# Patient Record
Sex: FEMALE | Race: WHITE | NOT HISPANIC OR LATINO | Employment: OTHER | URBAN - METROPOLITAN AREA
[De-identification: names, ages, dates, MRNs, and addresses within clinical notes are randomized per-mention and may not be internally consistent; named-entity substitution may affect disease eponyms.]

---

## 2024-10-12 ENCOUNTER — HOSPITAL ENCOUNTER (EMERGENCY)
Facility: MEDICAL CENTER | Age: 74
End: 2024-10-12
Attending: STUDENT IN AN ORGANIZED HEALTH CARE EDUCATION/TRAINING PROGRAM
Payer: MEDICARE

## 2024-10-12 VITALS
BODY MASS INDEX: 21.9 KG/M2 | WEIGHT: 116 LBS | HEART RATE: 60 BPM | RESPIRATION RATE: 20 BRPM | DIASTOLIC BLOOD PRESSURE: 60 MMHG | TEMPERATURE: 97 F | HEIGHT: 61 IN | SYSTOLIC BLOOD PRESSURE: 125 MMHG | OXYGEN SATURATION: 93 %

## 2024-10-12 DIAGNOSIS — R56.9 SEIZURE-LIKE ACTIVITY (HCC): ICD-10-CM

## 2024-10-12 LAB
ALBUMIN SERPL BCP-MCNC: 4.2 G/DL (ref 3.2–4.9)
ALBUMIN/GLOB SERPL: 2 G/DL
ALP SERPL-CCNC: 85 U/L (ref 30–99)
ALT SERPL-CCNC: 18 U/L (ref 2–50)
ANION GAP SERPL CALC-SCNC: 17 MMOL/L (ref 7–16)
AST SERPL-CCNC: 23 U/L (ref 12–45)
BASOPHILS # BLD AUTO: 0.6 % (ref 0–1.8)
BASOPHILS # BLD: 0.04 K/UL (ref 0–0.12)
BILIRUB SERPL-MCNC: 0.7 MG/DL (ref 0.1–1.5)
BUN SERPL-MCNC: 13 MG/DL (ref 8–22)
CALCIUM ALBUM COR SERPL-MCNC: 9.4 MG/DL (ref 8.5–10.5)
CALCIUM SERPL-MCNC: 9.6 MG/DL (ref 8.5–10.5)
CHLORIDE SERPL-SCNC: 103 MMOL/L (ref 96–112)
CO2 SERPL-SCNC: 21 MMOL/L (ref 20–33)
CREAT SERPL-MCNC: 0.61 MG/DL (ref 0.5–1.4)
EKG IMPRESSION: NORMAL
EOSINOPHIL # BLD AUTO: 0.04 K/UL (ref 0–0.51)
EOSINOPHIL NFR BLD: 0.6 % (ref 0–6.9)
ERYTHROCYTE [DISTWIDTH] IN BLOOD BY AUTOMATED COUNT: 42.5 FL (ref 35.9–50)
GFR SERPLBLD CREATININE-BSD FMLA CKD-EPI: 93 ML/MIN/1.73 M 2
GLOBULIN SER CALC-MCNC: 2.1 G/DL (ref 1.9–3.5)
GLUCOSE SERPL-MCNC: 123 MG/DL (ref 65–99)
HCT VFR BLD AUTO: 35.1 % (ref 37–47)
HGB BLD-MCNC: 12.3 G/DL (ref 12–16)
IMM GRANULOCYTES # BLD AUTO: 0.03 K/UL (ref 0–0.11)
IMM GRANULOCYTES NFR BLD AUTO: 0.5 % (ref 0–0.9)
LYMPHOCYTES # BLD AUTO: 1.18 K/UL (ref 1–4.8)
LYMPHOCYTES NFR BLD: 17.9 % (ref 22–41)
MCH RBC QN AUTO: 31.7 PG (ref 27–33)
MCHC RBC AUTO-ENTMCNC: 35 G/DL (ref 32.2–35.5)
MCV RBC AUTO: 90.5 FL (ref 81.4–97.8)
MONOCYTES # BLD AUTO: 0.58 K/UL (ref 0–0.85)
MONOCYTES NFR BLD AUTO: 8.8 % (ref 0–13.4)
NEUTROPHILS # BLD AUTO: 4.74 K/UL (ref 1.82–7.42)
NEUTROPHILS NFR BLD: 71.6 % (ref 44–72)
NRBC # BLD AUTO: 0 K/UL
NRBC BLD-RTO: 0 /100 WBC (ref 0–0.2)
NT-PROBNP SERPL IA-MCNC: 149 PG/ML (ref 0–125)
PLATELET # BLD AUTO: 189 K/UL (ref 164–446)
PMV BLD AUTO: 11 FL (ref 9–12.9)
POTASSIUM SERPL-SCNC: 4 MMOL/L (ref 3.6–5.5)
PROT SERPL-MCNC: 6.3 G/DL (ref 6–8.2)
RBC # BLD AUTO: 3.88 M/UL (ref 4.2–5.4)
SODIUM SERPL-SCNC: 141 MMOL/L (ref 135–145)
TROPONIN T SERPL-MCNC: 7 NG/L (ref 6–19)
WBC # BLD AUTO: 6.6 K/UL (ref 4.8–10.8)

## 2024-10-12 PROCEDURE — 36415 COLL VENOUS BLD VENIPUNCTURE: CPT

## 2024-10-12 PROCEDURE — 83880 ASSAY OF NATRIURETIC PEPTIDE: CPT

## 2024-10-12 PROCEDURE — 93005 ELECTROCARDIOGRAM TRACING: CPT | Performed by: STUDENT IN AN ORGANIZED HEALTH CARE EDUCATION/TRAINING PROGRAM

## 2024-10-12 PROCEDURE — 84484 ASSAY OF TROPONIN QUANT: CPT

## 2024-10-12 PROCEDURE — 85025 COMPLETE CBC W/AUTO DIFF WBC: CPT

## 2024-10-12 PROCEDURE — 99284 EMERGENCY DEPT VISIT MOD MDM: CPT

## 2024-10-12 PROCEDURE — 80053 COMPREHEN METABOLIC PANEL: CPT

## 2024-10-16 ENCOUNTER — TELEPHONE (OUTPATIENT)
Dept: HEALTH INFORMATION MANAGEMENT | Facility: OTHER | Age: 74
End: 2024-10-16
Payer: MEDICARE

## 2024-10-23 ENCOUNTER — OFFICE VISIT (OUTPATIENT)
Dept: MEDICAL GROUP | Facility: MEDICAL CENTER | Age: 74
End: 2024-10-23
Payer: MEDICARE

## 2024-10-23 VITALS
BODY MASS INDEX: 22.43 KG/M2 | TEMPERATURE: 98.2 F | OXYGEN SATURATION: 93 % | WEIGHT: 118.83 LBS | DIASTOLIC BLOOD PRESSURE: 58 MMHG | HEIGHT: 61 IN | HEART RATE: 80 BPM | SYSTOLIC BLOOD PRESSURE: 120 MMHG

## 2024-10-23 DIAGNOSIS — F33.9 RECURRENT MAJOR DEPRESSIVE DISORDER, REMISSION STATUS UNSPECIFIED (HCC): ICD-10-CM

## 2024-10-23 DIAGNOSIS — I10 PRIMARY HYPERTENSION: ICD-10-CM

## 2024-10-23 DIAGNOSIS — Z23 NEED FOR VACCINATION: ICD-10-CM

## 2024-10-23 DIAGNOSIS — Z76.89 ENCOUNTER TO ESTABLISH CARE: ICD-10-CM

## 2024-10-23 DIAGNOSIS — Z86.73 HISTORY OF STROKE: ICD-10-CM

## 2024-10-23 DIAGNOSIS — E78.2 MIXED HYPERLIPIDEMIA: ICD-10-CM

## 2024-10-23 DIAGNOSIS — L98.9 LESION OF FINGER: ICD-10-CM

## 2024-10-23 DIAGNOSIS — R56.9 SEIZURE (HCC): ICD-10-CM

## 2024-10-23 DIAGNOSIS — N95.1 MENOPAUSAL STATE: ICD-10-CM

## 2024-10-23 DIAGNOSIS — Z78.0 POSTMENOPAUSAL STATUS (AGE-RELATED) (NATURAL): ICD-10-CM

## 2024-10-23 DIAGNOSIS — Z12.11 SCREEN FOR COLON CANCER: ICD-10-CM

## 2024-10-23 DIAGNOSIS — Z11.59 NEED FOR HEPATITIS C SCREENING TEST: ICD-10-CM

## 2024-10-23 DIAGNOSIS — R53.1 RIGHT SIDED WEAKNESS: ICD-10-CM

## 2024-10-23 PROBLEM — F32.A DEPRESSION: Status: ACTIVE | Noted: 2024-10-23

## 2024-10-23 PROCEDURE — 90670 PCV13 VACCINE IM: CPT | Mod: JZ | Performed by: NURSE PRACTITIONER

## 2024-10-23 PROCEDURE — 3074F SYST BP LT 130 MM HG: CPT | Performed by: NURSE PRACTITIONER

## 2024-10-23 PROCEDURE — 99204 OFFICE O/P NEW MOD 45 MIN: CPT | Mod: 25 | Performed by: NURSE PRACTITIONER

## 2024-10-23 PROCEDURE — 3078F DIAST BP <80 MM HG: CPT | Performed by: NURSE PRACTITIONER

## 2024-10-23 PROCEDURE — G0008 ADMIN INFLUENZA VIRUS VAC: HCPCS | Performed by: NURSE PRACTITIONER

## 2024-10-23 PROCEDURE — 90662 IIV NO PRSV INCREASED AG IM: CPT | Performed by: NURSE PRACTITIONER

## 2024-10-23 PROCEDURE — G0009 ADMIN PNEUMOCOCCAL VACCINE: HCPCS | Performed by: NURSE PRACTITIONER

## 2024-10-23 RX ORDER — ATORVASTATIN CALCIUM 20 MG/1
20 TABLET, FILM COATED ORAL
COMMUNITY
Start: 2024-09-17 | End: 2024-10-23 | Stop reason: SDUPTHER

## 2024-10-23 RX ORDER — AMLODIPINE BESYLATE 2.5 MG/1
TABLET ORAL
COMMUNITY
Start: 2024-09-17 | End: 2024-10-23 | Stop reason: SDUPTHER

## 2024-10-23 RX ORDER — AMLODIPINE BESYLATE 2.5 MG/1
2.5 TABLET ORAL DAILY
Qty: 90 TABLET | Refills: 1 | Status: SHIPPED | OUTPATIENT
Start: 2024-10-23

## 2024-10-23 RX ORDER — SERTRALINE HYDROCHLORIDE 25 MG/1
TABLET, FILM COATED ORAL
COMMUNITY
Start: 2024-09-15 | End: 2024-10-23 | Stop reason: SDUPTHER

## 2024-10-23 RX ORDER — SERTRALINE HYDROCHLORIDE 25 MG/1
25 TABLET, FILM COATED ORAL DAILY
Qty: 90 TABLET | Refills: 1 | Status: SHIPPED | OUTPATIENT
Start: 2024-10-23

## 2024-10-23 RX ORDER — LEVETIRACETAM 500 MG/1
TABLET ORAL
COMMUNITY
Start: 2024-09-17 | End: 2024-10-23 | Stop reason: SDUPTHER

## 2024-10-23 RX ORDER — LEVETIRACETAM 500 MG/1
500 TABLET ORAL 2 TIMES DAILY
Qty: 180 TABLET | Refills: 1 | Status: SHIPPED | OUTPATIENT
Start: 2024-10-23

## 2024-10-23 RX ORDER — ATORVASTATIN CALCIUM 20 MG/1
20 TABLET, FILM COATED ORAL
Qty: 90 TABLET | Refills: 1 | Status: SHIPPED | OUTPATIENT
Start: 2024-10-23

## 2024-10-23 ASSESSMENT — PATIENT HEALTH QUESTIONNAIRE - PHQ9: CLINICAL INTERPRETATION OF PHQ2 SCORE: 0

## 2024-10-23 ASSESSMENT — ENCOUNTER SYMPTOMS
SORE THROAT: 0
BLOOD IN STOOL: 0
CONSTIPATION: 0
NERVOUS/ANXIOUS: 0
SENSORY CHANGE: 0
EYE REDNESS: 0
INSOMNIA: 0
WEIGHT LOSS: 0
ABDOMINAL PAIN: 0
WHEEZING: 0
EYE PAIN: 0
BACK PAIN: 0
SPEECH CHANGE: 0
WEAKNESS: 0
EYE DISCHARGE: 0
POLYDIPSIA: 0
LOSS OF CONSCIOUSNESS: 0
HEADACHES: 0
SHORTNESS OF BREATH: 0
SINUS PAIN: 0
FEVER: 0
TREMORS: 0
DIZZINESS: 0
SPUTUM PRODUCTION: 0
CHILLS: 0
PALPITATIONS: 0
DEPRESSION: 0
NAUSEA: 0
COUGH: 0
FOCAL WEAKNESS: 0
MYALGIAS: 0
VOMITING: 0
BRUISES/BLEEDS EASILY: 0
DIARRHEA: 0
FLANK PAIN: 0

## 2024-10-23 ASSESSMENT — FIBROSIS 4 INDEX: FIB4 SCORE: 2.15

## 2024-10-24 ENCOUNTER — HOME CARE VISIT (OUTPATIENT)
Dept: HOME HEALTH SERVICES | Facility: HOME HEALTHCARE | Age: 74
End: 2024-10-24

## 2024-10-24 ENCOUNTER — HOME HEALTH ADMISSION (OUTPATIENT)
Dept: HOME HEALTH SERVICES | Facility: HOME HEALTHCARE | Age: 74
End: 2024-10-24
Payer: MEDICARE

## 2024-10-30 ENCOUNTER — HOME CARE VISIT (OUTPATIENT)
Dept: HOME HEALTH SERVICES | Facility: HOME HEALTHCARE | Age: 74
End: 2024-10-30
Payer: MEDICARE

## 2024-10-30 VITALS
HEART RATE: 62 BPM | HEIGHT: 61 IN | DIASTOLIC BLOOD PRESSURE: 70 MMHG | TEMPERATURE: 97.4 F | WEIGHT: 116 LBS | BODY MASS INDEX: 21.9 KG/M2 | SYSTOLIC BLOOD PRESSURE: 130 MMHG | RESPIRATION RATE: 16 BRPM | OXYGEN SATURATION: 95 %

## 2024-10-30 PROCEDURE — G0493 RN CARE EA 15 MIN HH/HOSPICE: HCPCS

## 2024-10-30 PROCEDURE — 665005 NO-PAY RAP - HOME HEALTH

## 2024-10-30 ASSESSMENT — ENCOUNTER SYMPTOMS
HIGHEST PAIN SEVERITY IN PAST 24 HOURS: 3/10
PAIN: 1
NAUSEA: DENIES
LOWEST PAIN SEVERITY IN PAST 24 HOURS: 0/10
SHORTNESS OF BREATH: 1
DIZZINESS: 1
DYSPNEA ACTIVITY LEVEL: AFTER AMBULATING MORE THAN 20 FT
STOOL FREQUENCY: LESS THAN DAILY
BOWEL PATTERN NORMAL: 1
PAIN SEVERITY GOAL: 0/10
VOMITING: DENIES
LAST BOWEL MOVEMENT: 67142
PAIN LOCATION - PAIN SEVERITY: 0/10
SUBJECTIVE PAIN PROGRESSION: UNCHANGED

## 2024-10-30 ASSESSMENT — ACTIVITIES OF DAILY LIVING (ADL): OASIS_M1830: 03

## 2024-10-30 ASSESSMENT — FIBROSIS 4 INDEX: FIB4 SCORE: 2.15

## 2024-10-31 ENCOUNTER — HOME CARE VISIT (OUTPATIENT)
Dept: HOME HEALTH SERVICES | Facility: HOME HEALTHCARE | Age: 74
End: 2024-10-31
Payer: MEDICARE

## 2024-10-31 ENCOUNTER — DOCUMENTATION (OUTPATIENT)
Dept: MEDICAL GROUP | Facility: PHYSICIAN GROUP | Age: 74
End: 2024-10-31
Payer: MEDICARE

## 2024-11-01 ENCOUNTER — HOME CARE VISIT (OUTPATIENT)
Dept: HOME HEALTH SERVICES | Facility: HOME HEALTHCARE | Age: 74
End: 2024-11-01
Payer: MEDICARE

## 2024-11-01 ENCOUNTER — TELEPHONE (OUTPATIENT)
Dept: HEALTH INFORMATION MANAGEMENT | Facility: OTHER | Age: 74
End: 2024-11-01
Payer: MEDICARE

## 2024-11-01 VITALS
RESPIRATION RATE: 14 BRPM | HEART RATE: 64 BPM | SYSTOLIC BLOOD PRESSURE: 130 MMHG | TEMPERATURE: 98.8 F | DIASTOLIC BLOOD PRESSURE: 56 MMHG | OXYGEN SATURATION: 97 %

## 2024-11-01 PROCEDURE — G0151 HHCP-SERV OF PT,EA 15 MIN: HCPCS

## 2024-11-01 ASSESSMENT — ENCOUNTER SYMPTOMS
PAIN LOCATION - PAIN QUALITY: ACHE
PAIN LOCATION - EXACERBATING FACTORS: MOVEMENT
LOWEST PAIN SEVERITY IN PAST 24 HOURS: 2/10
PAIN SEVERITY GOAL: 2/10
PAIN LOCATION - PAIN DURATION: DAILY
PAIN LOCATION - RELIEVING FACTORS: REST,
PERSON REPORTING PAIN: PATIENT
PAIN: 1
PAIN LOCATION - PAIN FREQUENCY: CONSTANT
PAIN LOCATION - PAIN SEVERITY: 2/10
PAIN LOCATION: RIGHT LEG
HIGHEST PAIN SEVERITY IN PAST 24 HOURS: 2/10

## 2024-11-01 ASSESSMENT — ACTIVITIES OF DAILY LIVING (ADL)
AMBULATION_DISTANCE/DURATION_TOLERATED: 30 FEET
AMBULATION ASSISTANCE ON UNEVEN SURFACES: 1
AMBULATION ASSISTANCE ON FLAT SURFACES: 1

## 2024-11-01 NOTE — Clinical Note
Physical therapy evaluation performed 11/1/2024 and I follow 1 time a week for 1 week and 2 times a week for 4 weeks.  Further insurance authorization may be required.  Thank you

## 2024-11-02 NOTE — HOME HEALTH
PHYSICAL THERAPY EVALUATION AND PLAN OF CARE     ·       Patient: Toshia Kulkarni     ·       Home Health Admission due to: Recent ED visit for seizure-like activity plus a referral from PCP who presents with decreased functional mobility, decreased balance, decreased activity tolerance, decreased functional extremity strength, significant right lower extremity extensor tone which limits her functioning, fused right ankle, some decreased memory, significant expressive aphasia and increased risk for falls      ·       Living Situation/PLOF: Patient lives in a single-story home with 2 steps without a railing to enter along with her son and son's family.  Patient's son is her primary caregiver.  Patient has SPC and a bath chair.  Son reports that patient has had mild decline in functioning since she arrived to live with her son 2 months ago.  He feels that her extensor tone in the right lower extremity really inhibits her functioning.  Patient's son does state that she was independent in her ADLs except for showering but assisted with most IADLs.     ·       Past Medical History: CVA (1999), seizure disorder, hyperlipidemia, depression, HTN      ·       Skilled Therapeutic Need: Decreased activity tolerance, LE weakness, Balance control, Generalized deconditioning, Gait training, Caregiver training, Fall prevention and Knowledge of condition     Recommend skilled HHPT to address deficits and improve function-report sent to MD     ·       Frequency:   1 time per week for 1 week and 2 times a week for 4 weeks,  Effective 11/1/2024    Does the patient get SOB with  exertion?  None noted during evaluation    How often (if at all) does pain interfere with patient's movements?  On a daily not constant basis

## 2024-11-04 NOTE — CASE COMMUNICATION
noted  ----- Message -----  From: Sofía Banks, PT  Sent: 11/1/2024   6:25 PM PST  To: Nadine Treadwell R.N.; Ashley Shin, SLP; *      Physical therapy evaluation performed 11/1/2024 and I follow 1 time a week for 1 week and 2 times a week for 4 weeks.  Further insurance authorization may be required.  Thank you

## 2024-11-06 ENCOUNTER — HOME CARE VISIT (OUTPATIENT)
Dept: HOME HEALTH SERVICES | Facility: HOME HEALTHCARE | Age: 74
End: 2024-11-06
Payer: MEDICARE

## 2024-11-06 PROCEDURE — G0151 HHCP-SERV OF PT,EA 15 MIN: HCPCS

## 2024-11-06 NOTE — Clinical Note
Pt has strong extensor tone and synergy pattern which greatly limits function. Would you consider referral to physiatry for evaluation and Tx?    Patient has an outpatient wound care appointment on Monday to assess her right third digit.  Today it appeared reddened and therapist instructed patient that it should the redness worsen or the scab opened up patient should go to urgent care to be assessed or contact [facility].  Patient and son had good verbal return

## 2024-11-06 NOTE — Clinical Note
thank you for responding. Have a gtreat weekend  ----- Message -----  From: LUKE Sanchez  Sent: 11/7/2024  10:19 AM PST  To: Sofía Banks PT      Thank you Sofía, I placed a referral to physiatry.  Have a great week,    Libby  ----- Message -----  From: Sofía Banks PT  Sent: 11/7/2024   3:32 AM PST  To: Nadine Treadwell R.N.; LUKE Sanchez    Pt has strong extensor tone and synergy pattern which greatly limits function. Would you consider referral to physiatry for evaluation and Tx?    Patient has an outpatient wound care appointment on Monday to assess her right third digit.  Today it appeared reddened and therapist instructed patient that it should the redness worsen or the scab opened up patient should go to urgent care to be assessed or contact [facility].  Patient and son had good verbal return

## 2024-11-07 ENCOUNTER — HOME CARE VISIT (OUTPATIENT)
Dept: HOME HEALTH SERVICES | Facility: HOME HEALTHCARE | Age: 74
End: 2024-11-07
Payer: MEDICARE

## 2024-11-07 ENCOUNTER — TELEPHONE (OUTPATIENT)
Dept: MEDICAL GROUP | Facility: MEDICAL CENTER | Age: 74
End: 2024-11-07
Payer: MEDICARE

## 2024-11-07 VITALS
DIASTOLIC BLOOD PRESSURE: 64 MMHG | OXYGEN SATURATION: 96 % | TEMPERATURE: 97.8 F | SYSTOLIC BLOOD PRESSURE: 128 MMHG | RESPIRATION RATE: 16 BRPM | HEART RATE: 62 BPM

## 2024-11-07 VITALS
DIASTOLIC BLOOD PRESSURE: 66 MMHG | OXYGEN SATURATION: 98 % | SYSTOLIC BLOOD PRESSURE: 130 MMHG | HEART RATE: 68 BPM | TEMPERATURE: 99.3 F | RESPIRATION RATE: 14 BRPM

## 2024-11-07 DIAGNOSIS — R53.1 RIGHT SIDED WEAKNESS: ICD-10-CM

## 2024-11-07 PROCEDURE — G0153 HHCP-SVS OF S/L PATH,EA 15MN: HCPCS

## 2024-11-07 ASSESSMENT — ENCOUNTER SYMPTOMS
SUBJECTIVE PAIN PROGRESSION: WAXING AND WANING
PERSON REPORTING PAIN: PATIENT
PAIN SEVERITY GOAL: 0/10
LOWEST PAIN SEVERITY IN PAST 24 HOURS: 0/10
HIGHEST PAIN SEVERITY IN PAST 24 HOURS: 0/10
DENIES PAIN: 1

## 2024-11-08 ENCOUNTER — HOME CARE VISIT (OUTPATIENT)
Dept: HOME HEALTH SERVICES | Facility: HOME HEALTHCARE | Age: 74
End: 2024-11-08
Payer: MEDICARE

## 2024-11-08 PROCEDURE — G0151 HHCP-SERV OF PT,EA 15 MIN: HCPCS

## 2024-11-08 NOTE — CASE COMMUNICATION
noted  ----- Message -----  From: Sofía Banks, PT  Sent: 11/7/2024   3:32 AM PST  To: Nadine Treadwell R.N.; TREVIN Sanchez.      Pt has strong extensor tone and synergy pattern which greatly limits function. Would you consider referral to physiatry for evaluation and Tx?    Patient has an outpatient wound care appointment on Monday to assess her right third digit.  Today it appeared reddened and therapist instructed patient  that it should the redness worsen or the scab opened up patient should go to urgent care to be assessed or contact [facility].  Patient and son had good verbal return

## 2024-11-09 VITALS
TEMPERATURE: 98.1 F | RESPIRATION RATE: 12 BRPM | DIASTOLIC BLOOD PRESSURE: 62 MMHG | HEART RATE: 68 BPM | SYSTOLIC BLOOD PRESSURE: 110 MMHG | OXYGEN SATURATION: 96 %

## 2024-11-09 ASSESSMENT — ENCOUNTER SYMPTOMS
DENIES PAIN: 1
HIGHEST PAIN SEVERITY IN PAST 24 HOURS: 0/10
PAIN SEVERITY GOAL: 0/10
LOWEST PAIN SEVERITY IN PAST 24 HOURS: 0/10
PERSON REPORTING PAIN: PATIENT
SUBJECTIVE PAIN PROGRESSION: UNCHANGED

## 2024-11-11 ENCOUNTER — HOME CARE VISIT (OUTPATIENT)
Dept: HOME HEALTH SERVICES | Facility: HOME HEALTHCARE | Age: 74
End: 2024-11-11
Payer: MEDICARE

## 2024-11-11 ENCOUNTER — OFFICE VISIT (OUTPATIENT)
Dept: WOUND CARE | Facility: MEDICAL CENTER | Age: 74
End: 2024-11-11
Attending: NURSE PRACTITIONER
Payer: MEDICARE

## 2024-11-11 VITALS
HEART RATE: 71 BPM | DIASTOLIC BLOOD PRESSURE: 70 MMHG | OXYGEN SATURATION: 96 % | TEMPERATURE: 97.2 F | RESPIRATION RATE: 15 BRPM | SYSTOLIC BLOOD PRESSURE: 122 MMHG

## 2024-11-11 DIAGNOSIS — R53.1 RIGHT SIDED WEAKNESS: ICD-10-CM

## 2024-11-11 DIAGNOSIS — S61.202A OPEN WOUND OF RIGHT MIDDLE FINGER: ICD-10-CM

## 2024-11-11 DIAGNOSIS — Z86.73 HISTORY OF STROKE: ICD-10-CM

## 2024-11-11 PROCEDURE — 11042 DBRDMT SUBQ TIS 1ST 20SQCM/<: CPT

## 2024-11-11 PROCEDURE — 99204 OFFICE O/P NEW MOD 45 MIN: CPT | Performed by: STUDENT IN AN ORGANIZED HEALTH CARE EDUCATION/TRAINING PROGRAM

## 2024-11-11 PROCEDURE — 99214 OFFICE O/P EST MOD 30 MIN: CPT

## 2024-11-11 PROCEDURE — 3078F DIAST BP <80 MM HG: CPT | Performed by: STUDENT IN AN ORGANIZED HEALTH CARE EDUCATION/TRAINING PROGRAM

## 2024-11-11 PROCEDURE — 3074F SYST BP LT 130 MM HG: CPT | Performed by: STUDENT IN AN ORGANIZED HEALTH CARE EDUCATION/TRAINING PROGRAM

## 2024-11-11 ASSESSMENT — ENCOUNTER SYMPTOMS
SEIZURES: 1
FOCAL WEAKNESS: 1
FEVER: 0
CLAUDICATION: 0
CHILLS: 0
SPEECH CHANGE: 1

## 2024-11-11 NOTE — CASE COMMUNICATION
Quality Review for SOC OASIS by ISHA Saenz RN on  November 11, 2024    Edits completed by ISHA Saenz RN:  1.  and  diagnosis coding updated per chart review.  2. Changed  to 10/30/24 per the LSOC order  3.  changed to 13 per care plan therapy sets.  4. Unchecked antipsychotic in  per MAR

## 2024-11-11 NOTE — PROGRESS NOTES
Patient has PT and SLP through [facility]. Patient and family are able to manage wound dressings and don't require RN visits for wound care at this time.Education provided on dressing changes.

## 2024-11-11 NOTE — CASE COMMUNICATION
I agree with these changes.  ----- Message -----  From: Sveta Saenz R.N.  Sent: 11/11/2024  11:16 AM PST  To: Emma Carbajal R.N.      Quality Review for SOC OASIS by ISHA Saenz RN on  November 11, 2024    Edits completed by ISHA Saenz RN:  1.  and  diagnosis coding updated per chart review.  2. Changed  to 10/30/24 per the LSOC order  3.  changed to 13 per care plan therapy sets.  4. Unchecked ant ipsychotic in  per MAR

## 2024-11-11 NOTE — PROGRESS NOTES
Provider Encounter- Full Thickness wound    HISTORY OF PRESENT ILLNESS  Wound History:    START OF CARE IN CLINIC: 11/11/2024    REFERRING PROVIDER: Libby Fu      WOUND- Full Thickness Wound   LOCATION: Right dorsal 3rd finger wound   HISTORY:  74F with PMHx of remove CVA with right sided weakness, extensive surgery right arm due to contractures and reportedly all tendons purposely cut to prevent contracture. Patient is poor historian, she reports feeling pain right 3rd finger then noted wounds approximately 9+ months ago. She does not recall any previous traumatic events. Patient has had extensive right arm and hand surgery in past to resolve contractures of right arm and hand, she reports tendons in hand were surgically cut. Patient was seeing dermatology in [place] for hand wound, reportedly was biopsied and negative for malignancy. They were applying mupirocin which they said made wound worse. She is currently cleaning and leaving wound open to air. Patient was seen by PCP and referred to [facility] and to hand surgery, they did not schedule hand surgery appointment as they wanted to have evaluation of wound in wound clinic first.    Pertinent Medical History: See above     TOBACCO USE:  Denies use    Patient's problem list, allergies, and current medications reviewed and updated in Epic    Interval History:  11/11/2024: Clinic visit with Osbaldo Buck MD. Patient is accompanied by son. Patient recently moved to [place] from [place] and is establishing with medical system here in [place] where she will be residing long term. Patient with prior history of stroke with expressive aphasia and right arm weakness. They report that she had extensive prior surgical history of right arm and hand in past due to contractures that she had previously developed. She does not recall any traumatic events that preceded wound formation.    REVIEW OF SYSTEMS:   Review of Systems   Constitutional:  Negative for chills,  fever and malaise/fatigue.   Cardiovascular:  Negative for claudication and leg swelling.   Skin:         Open wound right middle finger   Neurological:  Positive for speech change, focal weakness and seizures.        All chronic from remote stroke       PHYSICAL EXAMINATION:   /70   Pulse 71   Temp 36.2 °C (97.2 °F) (Temporal)   Resp 15   SpO2 96%     Physical Exam  Constitutional:       General: She is not in acute distress.     Appearance: Normal appearance.   Cardiovascular:      Rate and Rhythm: Normal rate.      Pulses: Normal pulses.   Pulmonary:      Effort: Pulmonary effort is normal. No respiratory distress.   Skin:     Comments: Open wound right dorsal middle finger: Small open wound with undermining. Pinpoint wound tract that appears to probe to bone or joint. Purulence expressed from wound. No evidence of cellulitis.   Neurological:      Mental Status: She is alert.      Motor: Weakness present.      Comments: Chronic right sided weakness  Expressive aphasia         WOUND ASSESSMENT  Wound Finger, 3rd Distal Right (Active)   Wound Image    11/11/24 1400   Site Assessment Red 11/11/24 1400   Periwound Assessment Pink;Fragile;Scar tissue 11/11/24 1400   Margins Attached edges 11/11/24 1400   Drainage Amount Moderate 11/11/24 1400   Drainage Description Serosanguineous;Purulent 11/11/24 1400   Treatments Cleansed;Topical Lidocaine;Provider debridement;Site care 11/11/24 1400   Wound Cleansing Hypochlorus Acid 11/11/24 1400   Periwound Protectant No-sting Skin Prep 11/11/24 1400   Dressing Cleansing/Solutions Not Applicable 11/11/24 1400   Dressing Options Hydrofiber Silver;Hypafix Tape 11/11/24 1400   Dressing Change/Treatment Frequency Every 72 hrs, and As Needed 11/11/24 1400   Wound Team Following Weekly 11/11/24 1400   Wound Length (cm) 0 cm 11/11/24 1400   Wound Width (cm) 0 cm 11/11/24 1400   Wound Depth (cm) 0 cm 11/11/24 1400   Wound Surface Area (cm^2) 0 cm^2 11/11/24 1400   Wound Volume  "(cm^3) 0 cm^3 11/11/24 1400   Tunneling (cm) 0 cm 11/11/24 1400   Undermining (cm) 0 cm 11/11/24 1400   Wound Odor None 11/11/24 1400   Exposed Structures None 11/11/24 1400   Number of days:        PROCEDURE:   - No excisional debridement performed.  - Expressed purulence manually  - Nonselective debridement with cotton tipped applicator      Pertinent Labs and Diagnostics:    Labs:     A1c: No results found for: \"HBA1C\"       IMAGING: None    VASCULAR STUDIES: None    LAST  WOUND CULTURE:  DATE : No results found for: \"CULTRSULT\"      ASSESSMENT AND PLAN:     1. Open wound of right middle finger    11/11/2024  - Patient with small wound, unknown etiology  - Due to pinpoint wound tract that may communicate with bone or joint, will obtain Xray  - They have not made appointment with [facility] Hand surgery. Following imaging may consider making appointment  - Hold Abx currently  - Will treat with antimicrobial dressings  - Return to clinic next week   Wound Care: Hydrofiber silver, tape    2. History of stroke  3. Right sided weakness    11/11/2024  - Patient with extensive surgical history of right arm and hand to relive her of previously developed contractures after stroke  - Has good family support      PATIENT EDUCATION  - Importance of adequate nutrition for wound healing  -Advised to go to ER for any increased redness, swelling, drainage, or odor, or if patient develops fever, chills, nausea or vomiting.     My total time spent caring for the patient on the day of the encounter was 45 minutes, reviewing history, assessment, counseling and education, and coordination of care.  This does not include time spent on separately billable procedures/tests.    Please note that this note may have been created using voice recognition software. I have worked with technical experts from [employer] to optimize the interface.  I have made every reasonable attempt to correct obvious errors, but there may be errors of grammar and " possibly content that I did not discover before finalizing the note.    N

## 2024-11-11 NOTE — PATIENT INSTRUCTIONS
-Keep your wound dressing clean, dry, and intact. Change dressing every 2-3 days AND if it's over saturated, soiled or falls off.     -Should you experience any significant changes in your wound(s), such as infection (redness, swelling, localized heat, increased pain, fever > 101 F, chills) or have any questions regarding your home care instructions, please contact the wound center at [phone]. If after hours, contact your primary care physician or go to the hospital emergency room.  If you are admitted to any hospital, you will need a new referral to come back to the wound clinic and any scheduled appointments that you already have, may be cancelled.

## 2024-11-12 ENCOUNTER — HOME CARE VISIT (OUTPATIENT)
Dept: HOME HEALTH SERVICES | Facility: HOME HEALTHCARE | Age: 74
End: 2024-11-12
Payer: MEDICARE

## 2024-11-12 VITALS
DIASTOLIC BLOOD PRESSURE: 66 MMHG | TEMPERATURE: 97.9 F | HEART RATE: 72 BPM | SYSTOLIC BLOOD PRESSURE: 118 MMHG | OXYGEN SATURATION: 97 % | RESPIRATION RATE: 16 BRPM

## 2024-11-12 VITALS
SYSTOLIC BLOOD PRESSURE: 118 MMHG | RESPIRATION RATE: 16 BRPM | OXYGEN SATURATION: 97 % | DIASTOLIC BLOOD PRESSURE: 66 MMHG | TEMPERATURE: 97.9 F | HEART RATE: 72 BPM

## 2024-11-12 PROCEDURE — G0151 HHCP-SERV OF PT,EA 15 MIN: HCPCS

## 2024-11-12 PROCEDURE — G0153 HHCP-SVS OF S/L PATH,EA 15MN: HCPCS

## 2024-11-12 ASSESSMENT — ENCOUNTER SYMPTOMS
PERSON REPORTING PAIN: PATIENT
LOWEST PAIN SEVERITY IN PAST 24 HOURS: 0/10
POOR JUDGMENT: 1
PAIN: PAIN DOES NOT LIMIT
PAIN LOCATION: RIGHT HAND
SUBJECTIVE PAIN PROGRESSION: WAXING AND WANING
PAIN SEVERITY GOAL: 0/10
HIGHEST PAIN SEVERITY IN PAST 24 HOURS: 3/10
DENIES PAIN: 1

## 2024-11-13 ENCOUNTER — HOME CARE VISIT (OUTPATIENT)
Dept: HOME HEALTH SERVICES | Facility: HOME HEALTHCARE | Age: 74
End: 2024-11-13
Payer: MEDICARE

## 2024-11-13 ENCOUNTER — OFFICE VISIT (OUTPATIENT)
Dept: PHYSICAL MEDICINE AND REHAB | Facility: MEDICAL CENTER | Age: 74
End: 2024-11-13
Payer: MEDICARE

## 2024-11-13 VITALS
TEMPERATURE: 97 F | SYSTOLIC BLOOD PRESSURE: 124 MMHG | OXYGEN SATURATION: 96 % | HEIGHT: 61 IN | DIASTOLIC BLOOD PRESSURE: 72 MMHG | BODY MASS INDEX: 22.52 KG/M2 | HEART RATE: 75 BPM | WEIGHT: 119.27 LBS

## 2024-11-13 DIAGNOSIS — R53.1 RIGHT SIDED WEAKNESS: ICD-10-CM

## 2024-11-13 DIAGNOSIS — I69.353: ICD-10-CM

## 2024-11-13 DIAGNOSIS — Z71.82 EXERCISE COUNSELING: ICD-10-CM

## 2024-11-13 DIAGNOSIS — M89.8X1 PAIN OF RIGHT SCAPULA: ICD-10-CM

## 2024-11-13 DIAGNOSIS — R25.2 SPASTICITY: ICD-10-CM

## 2024-11-13 DIAGNOSIS — Z86.73 HISTORY OF STROKE: ICD-10-CM

## 2024-11-13 DIAGNOSIS — M79.604 ACUTE LEG PAIN, RIGHT: ICD-10-CM

## 2024-11-13 DIAGNOSIS — Z71.3 DIETARY COUNSELING: ICD-10-CM

## 2024-11-13 DIAGNOSIS — Z91.81 RISK FOR FALLS: ICD-10-CM

## 2024-11-13 DIAGNOSIS — R47.01: ICD-10-CM

## 2024-11-13 PROCEDURE — 99204 OFFICE O/P NEW MOD 45 MIN: CPT | Performed by: GENERAL PRACTICE

## 2024-11-13 PROCEDURE — 3078F DIAST BP <80 MM HG: CPT | Performed by: GENERAL PRACTICE

## 2024-11-13 PROCEDURE — G2211 COMPLEX E/M VISIT ADD ON: HCPCS | Performed by: GENERAL PRACTICE

## 2024-11-13 PROCEDURE — 3074F SYST BP LT 130 MM HG: CPT | Performed by: GENERAL PRACTICE

## 2024-11-13 PROCEDURE — 1125F AMNT PAIN NOTED PAIN PRSNT: CPT | Performed by: GENERAL PRACTICE

## 2024-11-13 RX ORDER — BACLOFEN 10 MG/1
5-10 TABLET ORAL NIGHTLY
Qty: 30 TABLET | Refills: 2 | Status: SHIPPED | OUTPATIENT
Start: 2024-11-13 | End: 2025-02-11

## 2024-11-13 ASSESSMENT — FIBROSIS 4 INDEX: FIB4 SCORE: 2.15

## 2024-11-13 ASSESSMENT — PATIENT HEALTH QUESTIONNAIRE - PHQ9
SUM OF ALL RESPONSES TO PHQ QUESTIONS 1-9: 3
CLINICAL INTERPRETATION OF PHQ2 SCORE: 2
5. POOR APPETITE OR OVEREATING: 0 - NOT AT ALL

## 2024-11-13 ASSESSMENT — PAIN SCALES - GENERAL: PAINLEVEL_OUTOF10: 5=MODERATE PAIN

## 2024-11-13 NOTE — Clinical Note
Libby Fu   Toshia Kulkarni was evaluated for right-sided weakness secondary to her stroke.  She actually has spasticity and contractures.  Going to trial baclofen.  Please encourage patient and son to obtain prior clinical notes for the last year or 2 to assist with her management plan.  Thank you for the referral.   Please text, voalte, or call me if further questions.       Best Regards,   Wilmer Mondragon, DO   Physical Medicine and Rehabilitation

## 2024-11-13 NOTE — CASE COMMUNICATION
Patient discussed today in interdisciplinary team meeting. Concerns noted of finger wound. Plan to address issue is going to wound clinic. Son needs to schedule XRay & SHARONDA followup.
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
## 2024-11-14 ENCOUNTER — HOSPITAL ENCOUNTER (OUTPATIENT)
Dept: RADIOLOGY | Facility: MEDICAL CENTER | Age: 74
End: 2024-11-14
Attending: GENERAL PRACTICE
Payer: MEDICARE

## 2024-11-14 ENCOUNTER — HOME CARE VISIT (OUTPATIENT)
Dept: HOME HEALTH SERVICES | Facility: HOME HEALTHCARE | Age: 74
End: 2024-11-14
Payer: MEDICARE

## 2024-11-14 ENCOUNTER — HOSPITAL ENCOUNTER (OUTPATIENT)
Dept: RADIOLOGY | Facility: MEDICAL CENTER | Age: 74
End: 2024-11-14
Attending: STUDENT IN AN ORGANIZED HEALTH CARE EDUCATION/TRAINING PROGRAM
Payer: MEDICARE

## 2024-11-14 ENCOUNTER — TELEPHONE (OUTPATIENT)
Dept: WOUND CARE | Facility: MEDICAL CENTER | Age: 74
End: 2024-11-14
Payer: MEDICARE

## 2024-11-14 VITALS
DIASTOLIC BLOOD PRESSURE: 64 MMHG | SYSTOLIC BLOOD PRESSURE: 118 MMHG | OXYGEN SATURATION: 94 % | RESPIRATION RATE: 16 BRPM | HEART RATE: 71 BPM | TEMPERATURE: 98.7 F

## 2024-11-14 DIAGNOSIS — M79.604 ACUTE LEG PAIN, RIGHT: ICD-10-CM

## 2024-11-14 DIAGNOSIS — M89.8X1 PAIN OF RIGHT SCAPULA: ICD-10-CM

## 2024-11-14 DIAGNOSIS — S61.202A OPEN WOUND OF RIGHT MIDDLE FINGER: ICD-10-CM

## 2024-11-14 PROCEDURE — 73564 X-RAY EXAM KNEE 4 OR MORE: CPT | Mod: RT

## 2024-11-14 PROCEDURE — 73030 X-RAY EXAM OF SHOULDER: CPT | Mod: RT

## 2024-11-14 PROCEDURE — G0153 HHCP-SVS OF S/L PATH,EA 15MN: HCPCS

## 2024-11-14 PROCEDURE — 73140 X-RAY EXAM OF FINGER(S): CPT | Mod: RT

## 2024-11-14 PROCEDURE — 73010 X-RAY EXAM OF SHOULDER BLADE: CPT | Mod: RT

## 2024-11-14 NOTE — Clinical Note
Patient refused 1 physical therapy visit this week and she did not want a substitute while this therapist was off.

## 2024-11-14 NOTE — PATIENT INSTRUCTIONS
"Diet  \"-Emphasizes fruits, vegetables, whole grains, and fat-free or low-fat milk and milk products  -Includes a variety of protein foods such as seafood, lean meats and poultry, eggs, legumes (beans and peas), soy products, nuts, and seeds.  -Is low in added sugars, sodium, saturated fats, trans fats, and cholesterol.  -Stays within your daily calorie needs\"  https://www.cdc.gov/healthyweight/healthy_eating/index.html    Exercise  \"We know 150 minutes of physical activity each week sounds like a lot, but you don’t have to do it all at once. It could be 30 minutes a day, 5 days a week. You can spread your activity out during the week and break it up into smaller chunks of time.\"  https://www.cdc.gov/physicalactivity/basics/adults/index.htm  \"Exercise, multidisciplinary rehabilitation, acupuncture, CBT, and mind-body practices were most consistently associated with durable slight to moderate improvements in function and pain for specific chronic pain conditions. \"  https://effectivehealthcare.ahrq.gov/products/nonpharma-treatment-pain/research-2018      Fall prevention  -please wear indoor closed toe shoes.  Avoid high heels, floppy slippers, socks, without and shoes with slick soles that can make you slips, stumble, and fall  -work on single leg balance  -Remove boxes, newspapers, electrical cords and phone cords from walkways.  -Move coffee tables, magazine racks and plant stands from high-traffic areas.  -Secure loose rugs with double-faced tape, tacks or a slip-resistant backing -- or remove loose rugs from your home.  -Repair loose, wooden floorboards and carpeting right away.  -Store clothing, dishes, food and other necessities within easy reach.  -Immediately clean spilled liquids, grease or food.  -Use nonslip mats in your bathtub or shower. Use a bath seat, which allows you to sit while showering.  Her  -keep home well lit  -Place night lights in your bedroom, bathroom and hallways.  -Place a lamp within " reach of your bed in case you need to get up in the middle of the night.  -Make clear paths to light switches that aren't near room entrances. Consider trading traditional switches for glow-in-the-dark or illuminated switches.  -Turn on the lights before going up or down stairs.  -Store flashlights in easy-to-find places in case of power outages.  -Review medications with PCP  -Handrails for both sides of stairways  -Nonslip treads for bare-wood steps  -A raised toilet seat or one with armrests  -Grab bars for the shower or tub  -A sturdy plastic seat for the shower or tub -- plus a hand-held shower nozzle for bathing while sitting down  -Continue physical activity as tolerated such as walking, water workouts, or columba chi  https://www.[facility].org/healthy-lifestyle/healthy-aging/in-depth/fall-prevention/art-55702635

## 2024-11-15 NOTE — CASE COMMUNICATION
noted  ----- Message -----  From: Sofía Banks, PT  Sent: 11/14/2024   5:25 PM PST  To: Nadine Treadwell R.N.; Pratibha Cloud R.N.; *      Patient refused 1 physical therapy visit this week and she did not want a substitute while this therapist was off.

## 2024-11-15 NOTE — TELEPHONE ENCOUNTER
Brief Wound Care Provider Note    Reviewed patients Xray obtained 11/14. Xray does not demonstrate obvious evidence of OM. Her Xray does demonstrate signficent hardware from previous reconstruction with what appears to be K wires. With chronic draining sinus tract with chronic purulent drainage, I am highly concerned for hardware infection.    I discussed findings and my concerns with son Attila on phone. I strongly recommend he make appointment with [facility] hand clinic for evaluation. We discussed possible treatment options including prolonged Abx therapy, removal of hardware, and amputation. Discussed risks of all options. He will make appointment with hand surgery for evaluation.    I feel that there is limited utility to wound care besides managing drainage and preventing infection. Son is comfortable with managing wound for now and will call clinic tomorrow to cancel wound care appointments. He is aware that referral is good for next 3 months incase there are any complications or they require assistance.

## 2024-11-15 NOTE — RESULT ENCOUNTER NOTE
Dear Toshia Kulkarni    I reviewed the results of your test.  There are no urgent findings that require urgent intervention.  We will discuss the results in detail at the follow-up visit.    Wilmer Mondragon DO

## 2024-11-17 ASSESSMENT — ENCOUNTER SYMPTOMS: DIFFICULTY THINKING: 1

## 2024-11-19 ENCOUNTER — HOME CARE VISIT (OUTPATIENT)
Dept: HOME HEALTH SERVICES | Facility: HOME HEALTHCARE | Age: 74
End: 2024-11-19
Payer: MEDICARE

## 2024-11-19 ENCOUNTER — APPOINTMENT (OUTPATIENT)
Dept: WOUND CARE | Facility: MEDICAL CENTER | Age: 74
End: 2024-11-19
Attending: NURSE PRACTITIONER
Payer: MEDICARE

## 2024-11-19 PROCEDURE — G0153 HHCP-SVS OF S/L PATH,EA 15MN: HCPCS

## 2024-11-20 ENCOUNTER — HOME CARE VISIT (OUTPATIENT)
Dept: HOME HEALTH SERVICES | Facility: HOME HEALTHCARE | Age: 74
End: 2024-11-20
Payer: MEDICARE

## 2024-11-20 VITALS
OXYGEN SATURATION: 96 % | HEART RATE: 68 BPM | TEMPERATURE: 98.4 F | SYSTOLIC BLOOD PRESSURE: 140 MMHG | DIASTOLIC BLOOD PRESSURE: 66 MMHG | RESPIRATION RATE: 16 BRPM

## 2024-11-20 VITALS
OXYGEN SATURATION: 98 % | DIASTOLIC BLOOD PRESSURE: 62 MMHG | TEMPERATURE: 97.9 F | HEART RATE: 70 BPM | SYSTOLIC BLOOD PRESSURE: 118 MMHG | RESPIRATION RATE: 16 BRPM

## 2024-11-20 PROCEDURE — G0151 HHCP-SERV OF PT,EA 15 MIN: HCPCS

## 2024-11-20 ASSESSMENT — ENCOUNTER SYMPTOMS
DENIES PAIN: 1
PERSON REPORTING PAIN: PATIENT
LOWEST PAIN SEVERITY IN PAST 24 HOURS: 0/10
HIGHEST PAIN SEVERITY IN PAST 24 HOURS: 0/10
SUBJECTIVE PAIN PROGRESSION: WAXING AND WANING
PERSON REPORTING PAIN: PATIENT
DENIES PAIN: 1
PAIN SEVERITY GOAL: 0/10

## 2024-11-20 NOTE — CASE COMMUNICATION
noted  ----- Message -----  From: MARIOLA Mendoza  Sent: 11/20/2024   7:50 AM PST  To: Sofía Banks PT; Nadine Treadwell R.N.; *      Added Baclofen to med list.  Med list updated. Please educate.

## 2024-11-21 ENCOUNTER — HOME CARE VISIT (OUTPATIENT)
Dept: HOME HEALTH SERVICES | Facility: HOME HEALTHCARE | Age: 74
End: 2024-11-21
Payer: MEDICARE

## 2024-11-21 VITALS
DIASTOLIC BLOOD PRESSURE: 64 MMHG | SYSTOLIC BLOOD PRESSURE: 116 MMHG | RESPIRATION RATE: 16 BRPM | HEART RATE: 73 BPM | TEMPERATURE: 97.4 F | OXYGEN SATURATION: 95 %

## 2024-11-21 PROCEDURE — G0153 HHCP-SVS OF S/L PATH,EA 15MN: HCPCS

## 2024-11-21 NOTE — Clinical Note
Patient and family requested no visits next week due to the holiday.  Plan for 2 ST visits week of 12/1/24; anticipate d/c 12/5/24.

## 2024-11-21 NOTE — CASE COMMUNICATION
noted  ----- Message -----  From: Sofía Banks, PT  Sent: 11/20/2024   7:23 PM PST  To: Nadine Treadwell R.N.; Ashley Shin, MARIOLA; *      Physical therapy will discharge patient on 11/28/2024.  When will speech discharge?  Thank you

## 2024-11-22 ENCOUNTER — HOME CARE VISIT (OUTPATIENT)
Dept: HOME HEALTH SERVICES | Facility: HOME HEALTHCARE | Age: 74
End: 2024-11-22
Payer: MEDICARE

## 2024-11-22 VITALS
TEMPERATURE: 97.6 F | DIASTOLIC BLOOD PRESSURE: 66 MMHG | RESPIRATION RATE: 12 BRPM | OXYGEN SATURATION: 96 % | SYSTOLIC BLOOD PRESSURE: 130 MMHG | HEART RATE: 68 BPM

## 2024-11-22 PROCEDURE — G0151 HHCP-SERV OF PT,EA 15 MIN: HCPCS

## 2024-11-22 ASSESSMENT — ENCOUNTER SYMPTOMS
PERSON REPORTING PAIN: PATIENT
LOWEST PAIN SEVERITY IN PAST 24 HOURS: 0/10
PAIN SEVERITY GOAL: 0/10
DENIES PAIN: 1
HIGHEST PAIN SEVERITY IN PAST 24 HOURS: 2/10
SUBJECTIVE PAIN PROGRESSION: WAXING AND WANING

## 2024-11-22 NOTE — Clinical Note
Therapist discussed discharge planning once again.  Patient and family have requested no visits next week due to the holiday so therapist will see 2 times a week for 1 week the week of 12/1/2024 and perform physical therapy discharge at that time.

## 2024-11-25 ENCOUNTER — APPOINTMENT (OUTPATIENT)
Dept: WOUND CARE | Facility: MEDICAL CENTER | Age: 74
End: 2024-11-25
Attending: NURSE PRACTITIONER
Payer: MEDICARE

## 2024-11-25 NOTE — CASE COMMUNICATION
noted  ----- Message -----  From: Ashley Shin, MARIOLA  Sent: 11/24/2024  11:56 AM PST  To: Sofía Banks PT; Nadine Treadwell R.N.      Patient and family requested no visits next week due to the holiday.  Plan for 2 ST visits week of 12/1/24; anticipate d/c 12/5/24.

## 2024-11-25 NOTE — CASE COMMUNICATION
noted  ----- Message -----  From: Sofía Banks, PT  Sent: 11/22/2024   6:03 PM PST  To: Nadine Treadwell R.N.; Ashley Shin, SLP; *      Therapist discussed discharge planning once again.  Patient and family have requested no visits next week due to the holiday so therapist will see 2 times a week for 1 week the week of 12/1/2024 and perform physical therapy discharge at that time.

## 2024-11-27 ENCOUNTER — APPOINTMENT (OUTPATIENT)
Dept: WOUND CARE | Facility: MEDICAL CENTER | Age: 74
End: 2024-11-27
Attending: NURSE PRACTITIONER
Payer: MEDICARE

## 2024-12-02 ENCOUNTER — APPOINTMENT (OUTPATIENT)
Dept: WOUND CARE | Facility: MEDICAL CENTER | Age: 74
End: 2024-12-02
Attending: NURSE PRACTITIONER
Payer: MEDICARE

## 2024-12-03 ENCOUNTER — HOME CARE VISIT (OUTPATIENT)
Dept: HOME HEALTH SERVICES | Facility: HOME HEALTHCARE | Age: 74
End: 2024-12-03
Payer: MEDICARE

## 2024-12-03 VITALS
OXYGEN SATURATION: 98 % | DIASTOLIC BLOOD PRESSURE: 60 MMHG | TEMPERATURE: 99 F | SYSTOLIC BLOOD PRESSURE: 118 MMHG | HEART RATE: 64 BPM | RESPIRATION RATE: 16 BRPM

## 2024-12-03 PROBLEM — M25.641 STIFFNESS OF RIGHT HAND JOINT: Status: ACTIVE | Noted: 2024-12-03

## 2024-12-03 PROCEDURE — G0153 HHCP-SVS OF S/L PATH,EA 15MN: HCPCS

## 2024-12-03 PROCEDURE — G0151 HHCP-SERV OF PT,EA 15 MIN: HCPCS

## 2024-12-03 ASSESSMENT — ENCOUNTER SYMPTOMS
DENIES PAIN: 1
LOWEST PAIN SEVERITY IN PAST 24 HOURS: 0/10
HIGHEST PAIN SEVERITY IN PAST 24 HOURS: 0/10
PAIN SEVERITY GOAL: 0/10
SUBJECTIVE PAIN PROGRESSION: UNCHANGED

## 2024-12-04 NOTE — CASE COMMUNICATION
ntoed  ----- Message -----  From: Sofía Banks, PT  Sent: 12/3/2024   2:46 PM PST  To: Nadine Treadwell R.N.; Ashley Shin, SLP; *      Pt will be D/[place] from [facility] on 12/5/2024 with goals met

## 2024-12-05 ENCOUNTER — HOME CARE VISIT (OUTPATIENT)
Dept: HOME HEALTH SERVICES | Facility: HOME HEALTHCARE | Age: 74
End: 2024-12-05
Payer: MEDICARE

## 2024-12-05 VITALS
OXYGEN SATURATION: 96 % | RESPIRATION RATE: 16 BRPM | TEMPERATURE: 98.6 F | HEART RATE: 76 BPM | SYSTOLIC BLOOD PRESSURE: 126 MMHG | DIASTOLIC BLOOD PRESSURE: 56 MMHG

## 2024-12-05 VITALS
TEMPERATURE: 98.6 F | SYSTOLIC BLOOD PRESSURE: 126 MMHG | DIASTOLIC BLOOD PRESSURE: 56 MMHG | RESPIRATION RATE: 12 BRPM | OXYGEN SATURATION: 96 % | HEART RATE: 76 BPM

## 2024-12-05 PROCEDURE — G0151 HHCP-SERV OF PT,EA 15 MIN: HCPCS

## 2024-12-05 PROCEDURE — G0153 HHCP-SVS OF S/L PATH,EA 15MN: HCPCS

## 2024-12-05 ASSESSMENT — ENCOUNTER SYMPTOMS: DENIES PAIN: 1

## 2024-12-05 ASSESSMENT — ACTIVITIES OF DAILY LIVING (ADL)
OASIS_M1830: 02
HOME_HEALTH_OASIS: 01

## 2024-12-05 NOTE — Clinical Note
D/C from home care P.T. services with goals met 12/5/2024.    PT DISCHARGE SUMMARY:    The patient was seen for 8 home health physical therapy sessions.  Goals met.  The patient was discharged to self care  .  STATUS AT DISCHARGE:  Ambulation and Mobility: Independent in home without device, supervision in community with SPC  Patient Equipment: no assistive device for ambulation in home and SPC in community  Transferring: Independent except shower maximal assist due to tone  Bathing: Supervised  Dressing: Independent  Medication management: Able to take independently  Special equipment used: shower chair, SPC    Frequency of pain interfering with activity or movement: (drop downs)  - No pain    When is the patient dyspneic or noticeably Short of Breath: (drop downs)  - Patient is not short of breath

## 2024-12-06 ASSESSMENT — ENCOUNTER SYMPTOMS
DENIES PAIN: 1
DENIES PAIN: 1

## 2024-12-06 NOTE — HOME HEALTH
PT DISCHARGE SUMMARY:    The patient was seen for 8 home health physical therapy sessions.  Goals met.  The patient was discharged to self care  .  STATUS AT DISCHARGE:  Ambulation and Mobility: Independent in home without device, supervision in community with SPC  Patient Equipment: no assistive device for ambulation in home and SPC in community  Transferring: Independent except shower maximal assist due to tone  Bathing: Supervised  Dressing: Independent  Medication management: Able to take independently  Special equipment used: shower chair, SPC    Frequency of pain interfering with activity or movement: (drop downs)  - No pain    When is the patient dyspneic or noticeably Short of Breath: (drop downs)  - Patient is not short of breath

## 2024-12-08 ENCOUNTER — HOME CARE VISIT (OUTPATIENT)
Dept: HOME HEALTH SERVICES | Facility: HOME HEALTHCARE | Age: 74
End: 2024-12-08
Payer: MEDICARE

## 2024-12-09 NOTE — CASE COMMUNICATION
Quality Review for 12.5.24 IN OASIS performed on by LYNN Anthony RN on 12.8.2024:    Edits completed by LYNN Anthony RN:  1. Changed  to 0 per narrative and  C to no  2. Changed  to 2 per [id number] L, M response of 4  3. Changed  C to yes per POC

## 2024-12-09 NOTE — CASE COMMUNICATION
I agree with these changes.  Thank you.      ----- Message -----  From: Jessenia Anthony R.N.  Sent: 12/8/2024   5:32 PM PST  To: MARIOLA Mendoza      Quality Review for 12.5.24 DC OASIS performed on by LYNN Anthony RN on 12.8.2024:    Edits completed by LYNN Anthony RN:  1. Changed  to 0 per narrative and  C to no  2. Changed  to 2 per [id number] L, M response of 4  3. Changed  C to yes per POC

## 2024-12-10 ENCOUNTER — APPOINTMENT (OUTPATIENT)
Dept: WOUND CARE | Facility: MEDICAL CENTER | Age: 74
End: 2024-12-10
Attending: NURSE PRACTITIONER
Payer: MEDICARE

## 2024-12-10 ENCOUNTER — APPOINTMENT (OUTPATIENT)
Dept: NEUROLOGY | Facility: MEDICAL CENTER | Age: 74
End: 2024-12-10
Attending: STUDENT IN AN ORGANIZED HEALTH CARE EDUCATION/TRAINING PROGRAM
Payer: MEDICARE

## 2024-12-10 RX ORDER — BACLOFEN 10 MG/1
TABLET ORAL
Qty: 90 TABLET | Refills: 1 | Status: SHIPPED | OUTPATIENT
Start: 2024-12-10

## 2024-12-10 NOTE — PROGRESS NOTES
[facility]    Patient name: Toshia Kulkarni  YOB: 1950  MRN: [id number]  Referring provider: Deny Turner M.D.  [address] [place] [address] Emergency Room  Z  [place] [id number]   Date of visit: 12/10/2024     CC: Seizure      HPI:    Toshia Kulkarni is a 74 y.o. female who is referred for an initial neurologic consultation for seizures.     Onset: ***  Semiology: ***  Frequency: ***  Duration of spells: ***  Triggers: ***  Current treatment: ***  Previous treatments: ***  Status Epilepticus: ***    Seizure types and semiology:   Type I:  Seizure type:   Warning/Aura:   Description:    Loss of awareness:   Convulsive:   Post-ictal symptoms:   Frequency:    Duration:  Triggers:   Clusters of seizures:     Type 2:  Seizure type:   Warning/Aura:   Description:    Loss of awareness:   Convulsive:   Post-ictal symptoms:   Frequency:    Duration:  Triggers:   Clusters of seizures:        Last seizure: ***    Mood: ***    Side effects: ***    Barriers to taking medication appropriately: ***    Driving: ***    Seizure safety: ***    Vitamin D: ***    Women's issues in epilepsy: ***    Risk factors for epileptic seizures:  Normal birth history, no complications, born at term.   No history of significant head trauma.   No history of stroke or meningitis.  No family history of epilepsy.   No febrile seizures.       Risk factors for psychogenic seizures:  No previous psychiatric hospitalizations.   No history of preexisting psychiatric diagnoses.   No history of physical, sexual, or emotional abuse.   No history of fibromyalgia or disorder of chronic pain.   No history of seizures in a close friend or relative.   No history of seizure duration > 5 minutes.       ROS:   Constitutional: No fevers or chills.  Eyes: No blurry vision or eye pain.  ENT: No dysphagia or hearing loss.  Respiratory: No cough or shortness of breath.  Cardiovascular: No chest pain or palpitations.  GI: No nausea,  vomiting, or diarrhea.  : No urinary incontinence or dysuria.  Musculoskeletal: No joint swelling or arthralgias.  Skin: No skin rashes.  Neuro: No headaches, dizziness, or tremors.  Endocrine: No heat or cold intolerance. No polydipsia or polyuria.  Psych: No depression or anxiety.  Heme/Lymph: No easy bruising or swollen lymph nodes.  All other review of systems were reviewed and were negative.     Past Medical History: No past medical history on file.    Past Surgical History:   Past Surgical History:   Procedure Laterality Date    APPENDECTOMY         Social History:   Social History     Socioeconomic History    Marital status:      Spouse name: Not on file    Number of children: Not on file    Years of education: Not on file    Highest education level: Not on file   Occupational History    Not on file   Tobacco Use    Smoking status: Unknown    Smokeless tobacco: Not on file   Vaping Use    Vaping status: Never Used   Substance and Sexual Activity    Alcohol use: Never    Drug use: Never    Sexual activity: Not on file   Other Topics Concern    Not on file   Social History Narrative    Not on file     Social Drivers of Health     Financial Resource Strain: Not on file   Food Insecurity: Not on file   Transportation Needs: Not on file   Physical Activity: Not on file   Stress: Not on file   Social Connections: Feeling Socially Integrated (12/5/2024)    OASIS : Social Isolation     Frequency of experiencing loneliness or isolation: Never   Intimate Partner Violence: Not on file   Housing Stability: Not on file       Family Hx: No family history on file.    Current Medications:   Current Outpatient Medications:     amoxicillin-clavulanate (AUGMENTIN) 875-125 MG Tab, Take 1 Tablet by mouth 2 times a day., Disp: 20 Tablet, Rfl: 0    baclofen (LIORESAL) 10 MG Tab, Take 0.5-1 Tablets by mouth every evening for 90 days., Disp: 30 Tablet, Rfl: 2    aspirin (ASA) 325 MG Tab, Take 81 mg by mouth every day.  Indications: BLOOD THINNER, Disp: , Rfl:     Multiple Vitamins-Minerals (QC WOMENS DAILY MULTIVITAMIN PO), Take 1 Tablet by mouth every day. Indications: SUPPLEMENT, Disp: , Rfl:     Turmeric (QC TUMERIC COMPLEX PO), Take 1 Tablet by mouth every day. TUMERIC CURCUMIN + GINGER GUMMIES 1,350 MG TUMERIC 750 MG GINGER  Indications: SUPPLEMENT, Disp: , Rfl:     guaiFENesin (ROBITUSSIN) 100 MG/5ML liquid, Take 10 mL by mouth 2 times a day as needed for Cough. Indications: Cough (Patient not taking: Reported on 11/13/2024), Disp: , Rfl:     amLODIPine (NORVASC) 2.5 MG Tab, Take 1 Tablet by mouth every day., Disp: 90 Tablet, Rfl: 1    atorvastatin (LIPITOR) 20 MG Tab, Take 1 Tablet by mouth every day., Disp: 90 Tablet, Rfl: 1    levETIRAcetam (KEPPRA) 500 MG Tab, Take 1 Tablet by mouth 2 times a day., Disp: 180 Tablet, Rfl: 1    sertraline (ZOLOFT) 25 MG tablet, Take 1 Tablet by mouth every day., Disp: 90 Tablet, Rfl: 1    Allergies: Not on File      Physical Exam:   Ambulatory Vitals  There were no vitals filed for this visit.    Constitutional: Well-developed, well-nourished, good hygiene. Appears stated age.  Cardiovascular: RRR, with no murmurs, rubs or gallops. No carotid bruits. No peripheral edema, pedal pulses intact.   Respiratory: Lungs CTA B/L, no W/R/R.   Skin: Warm, dry, intact. No rashes observed.  Eyes:    Funduscopic: Optic discs flat with no evidence of papilledema or pallor. Normal posterior segments.  Neurologic:   Mental Status: Awake, alert, oriented x 3.   Speech: Fluent with normal prosody.   Memory: Able to recall 3 words at 1 minute and 5 minutes. Able to recall recent and remote events accurately.    Concentration: Attentive. Able to focus on history and follow multi-step commands.   Fund of Knowledge: Appropriate.   Cranial Nerves:    CN II: PERRL     CN III, IV, VI: EOMI without nystagmus    CN V: Facial sensation intact and symmetric in all 3 trigeminal distributions    CN VII: No facial  asymmetry    CN VIII: Hearing intact to finger rub     CN IX and X: Palate elevates symmetrically, gag reflex not tested    CN XI: Symmetric shoulder shrug     CN XII: Tongue midline   Motor: 5/5 in upper and lower extremities bilaterally   Sensory: Intact light touch, vibration and temperature diffusely    Coordination: No evidence of past-pointing on finger to nose testing, no dysdiadochokinesia. Heel to shin intact.    DTR's: 2+ throughout without clonus.    Babinski: Toes downgoing bilaterally.   Gait: ambulates steadily without assistive device. Romberg negative. Able to perform tandem gait.    Movements: No resting tremors or abnormal movements observed.   Musculoskeletal:    Strength: as above   Tone: Normal bulk and tone   Joints: No swelling    Studies:      Labs reviewed:      Imaging:     EEG Results:       Assessment/Plan:     There are no diagnoses linked to this encounter.      - vitamin D supplementation recommended.   - driving was discussed          There are no Patient Instructions on file for this visit.      Roya Bang M.D.   Diplomate, Neurology with Special Qualification in Epilepsy, American Board of Psychiatry and Neurology   of Clinical Neurology, [facility]  Level III Epilepsy Center, Department of Neurology at [facility]   12/10/2024      During today's encounter we discussed available treatment options and their individual side effect profiles. Total encounter time caring for patient today *** minutes.

## 2024-12-10 NOTE — TELEPHONE ENCOUNTER
BACLOFEN 10 MG TABLET       Pharmacy comment: REQUEST FOR 90 DAYS PRESCRIPTION.     Received request via: Pharmacy    Was the patient seen in the last year in this department? Yes    Does the patient have an active prescription (recently filled or refills available) for medication(s) requested?  Yes sent on  /    Pharmacy Name: [facility]    Does the patient have skilled nursing Plus and need 100-day supply? (This applies to ALL medications) Patient does not have SCP/

## 2024-12-13 ENCOUNTER — APPOINTMENT (OUTPATIENT)
Dept: RADIOLOGY | Facility: MEDICAL CENTER | Age: 74
End: 2024-12-13
Attending: NURSE PRACTITIONER
Payer: MEDICARE

## 2025-01-27 ENCOUNTER — OFFICE VISIT (OUTPATIENT)
Dept: NEUROLOGY | Facility: MEDICAL CENTER | Age: 75
End: 2025-01-27
Attending: PSYCHIATRY & NEUROLOGY
Payer: MEDICARE

## 2025-01-27 VITALS
SYSTOLIC BLOOD PRESSURE: 126 MMHG | WEIGHT: 121 LBS | OXYGEN SATURATION: 97 % | RESPIRATION RATE: 16 BRPM | DIASTOLIC BLOOD PRESSURE: 64 MMHG | HEART RATE: 62 BPM | TEMPERATURE: 98.1 F | HEIGHT: 61 IN | BODY MASS INDEX: 22.84 KG/M2

## 2025-01-27 DIAGNOSIS — M79.2 NEUROGENIC PAIN DUE TO CENTRAL NERVOUS SYSTEM ABNORMALITY FOLLOWING STROKE: ICD-10-CM

## 2025-01-27 DIAGNOSIS — Z86.73 HISTORY OF STROKE: ICD-10-CM

## 2025-01-27 DIAGNOSIS — R56.9 SEIZURE (HCC): ICD-10-CM

## 2025-01-27 DIAGNOSIS — I69.398 NEUROGENIC PAIN DUE TO CENTRAL NERVOUS SYSTEM ABNORMALITY FOLLOWING STROKE: ICD-10-CM

## 2025-01-27 PROCEDURE — 99204 OFFICE O/P NEW MOD 45 MIN: CPT | Performed by: PSYCHIATRY & NEUROLOGY

## 2025-01-27 PROCEDURE — 3078F DIAST BP <80 MM HG: CPT | Performed by: PSYCHIATRY & NEUROLOGY

## 2025-01-27 PROCEDURE — 99212 OFFICE O/P EST SF 10 MIN: CPT | Performed by: PSYCHIATRY & NEUROLOGY

## 2025-01-27 PROCEDURE — 3074F SYST BP LT 130 MM HG: CPT | Performed by: PSYCHIATRY & NEUROLOGY

## 2025-01-27 RX ORDER — BACLOFEN 10 MG/1
10 TABLET ORAL 3 TIMES DAILY
Qty: 90 TABLET | Refills: 5 | Status: SHIPPED | OUTPATIENT
Start: 2025-01-27

## 2025-01-27 RX ORDER — GABAPENTIN 100 MG/1
100 CAPSULE ORAL NIGHTLY
Qty: 90 CAPSULE | Refills: 5 | Status: SHIPPED | OUTPATIENT
Start: 2025-01-27 | End: 2025-01-27

## 2025-01-27 RX ORDER — GABAPENTIN 100 MG/1
300 CAPSULE ORAL NIGHTLY
Qty: 90 CAPSULE | Refills: 5 | Status: SHIPPED | OUTPATIENT
Start: 2025-01-27

## 2025-01-27 RX ORDER — LEVETIRACETAM 500 MG/1
500 TABLET ORAL 2 TIMES DAILY
Qty: 180 TABLET | Refills: 1 | Status: SHIPPED | OUTPATIENT
Start: 2025-01-27

## 2025-01-27 RX ORDER — GABAPENTIN 100 MG/1
100 CAPSULE ORAL 3 TIMES DAILY
Qty: 90 CAPSULE | Refills: 5 | Status: SHIPPED | OUTPATIENT
Start: 2025-01-27 | End: 2025-01-27

## 2025-01-27 ASSESSMENT — PATIENT HEALTH QUESTIONNAIRE - PHQ9: CLINICAL INTERPRETATION OF PHQ2 SCORE: 0

## 2025-01-27 ASSESSMENT — FIBROSIS 4 INDEX: FIB4 SCORE: 2.15

## 2025-01-27 NOTE — PATIENT INSTRUCTIONS
Please continue Keppra 500 mg twice daily.     Please take gabapentin 300 mg every night. You may decrease the dose to 200 mg nightly if you are too sleepy.     Please continue Baclofen with no changes.    Please let our office know if you have any changes in your seizure frequency and/characteristics. Otherwise, please keep the diary of your events and bring it with you at the time of your next follow up visit with our office.     Please take vitamin D3 [id number] internation units daily.     Please note that the following might precipitate seizures: missed doses of antiseizure medications, being sick with fever, stress, fatigue, sleep deprivation, not eating regularly, not drinking enough water, drinking too much alcohol, stopping alcohol suddenly if you are currently using it on a regular/daily basis, and/or using recreational drugs, among others.    Please note that the following might lead to an injury, potentially a life-threatening injury, in case you have a seizure and/or lose awareness while:   - being in a large body of water by yourself, such as bath, pool, lake, ocean, among others (risk of drowning)   - being on unprotected heights (risk of fall)   - being around and/or operating heavy machinery (risk of injury)   - being around open fire/hot surfaces (risk of burns)   - any other activities/circumstances, in which if you lose awareness, you might injure yourself and/or others.    Please call for help (crisis line and/or 911) in case you have thoughts of harming yourself and/or others.    Please abstain from driving until further notice.    ------------------------------------------------------------------------------------------  Instructions for your family/caregivers:  Please call 911 if the patient has a seizure longer than 2-3 minutes, if seizures are back to back without him recovering to his baseline, or he does not start recovering within 5-10 minutes after the seizure stops. During the seizure -  please turn him on his side, please make sure his head is protected (for example, you should put a pillow under his head, if one is available), and please do not put anything in his mouth.   -------------------------------------------------------------------------------------------    It is important that your seizures are well controlled and you have none or have them rarely. In addition to avoiding injury related to breakthrough seizures, frequent seizures increase risk of SUDEP (sudden unexpected death in epilepsy), where a person goes into a seizure and then never wakes up - this is a rare complication of seizure disorder; one of the best available ways to prevent it is to control your seizures well.     Due to the high volume of patients we are trying to help, your physician will not be able to respond by phone or in [employer] to your routine concerns between appointments.  This does not reflect a lack of interest or concern for you or your diagnosis.  Please bring these questions and concerns to your appointment where your physician can answer.  Please relay more pressing concerns to our office, either via [employer], or by phone; if not able to reach us please visit nearby Urgent Care Center or Emergency Department.  If any emergent medical needs, please seek emergent medical help and/or call 911.    Please note that we are not able to fill out paperwork that might be related to your work, utility company, disability, and/or driving, among others, in between the visits.  Please schedule a dedicated appointment to address your paperwork, so we can do that in a timely manner.  This is not due to lack of concern or interest for your disease-related work/administrative problems, but to make sure that we provide the best possible care and to fill out your paperwork in a correct and timely manner.    Thank you for entrusting your neurological care to [facility] and we look forward to continuing to serve you.

## 2025-01-27 NOTE — PROGRESS NOTES
[facility] Epilepsy Program  New Patient Visit      Patient's Name: Toshia Kulkarni  YOB: 1950  MRN: [id number]  Date of Service: 01/27/25    Referring Provider: Deny Turner M.D.  [address] [place] [address] Emergency Room  [address]  [place] [id number]    Chief Concern: Seizures.     The patient presents with her son and provides verbal consent for him to be present and provide additional information as needed.     HPI: The patient is a 74 y.o., left-handed female, who initially presented to my epilepsy clinic for evaluation of seizures on 01/27/2025.    The patient experienced genital tract challenges in late 1980s/1990s which led to hysterectomy.  In that context, she was receiving estrogen supplements after the surgery.  She also had dyslipidemia, of which she was unaware at the time.  She experienced what appeared to be a left hemispheric ischemic stroke in 1999, with the etiology of the stroke deemed to be related to estrogen supplementation and dyslipidemia.  No other risk factors were found as per the patient's son.    The patient experienced two initial seizures in 1999 but these were of unclear nature.  She was not started on antiseizure medications, and she remained seizure until around 22, when she experienced a seizure in the physician office.  She was started on Keppra at that time.  She remained stable on Keppra until October 2024, when she had breakthrough focal to bilateral tonic-clonic seizure, in context of right middle finger and suction.  Her infection resolved, and as of the time of the initial visit with me, she had no further seizures.    She denies ever experiencing sensation of strange smell/taste/gastric uprising, nor clear psychic/autonomic phenomena.    She does have significant pain and stiffness on the right side of her body, which was in the past treated with gabapentin, but for some reason gabapentin was stopped prior to the initial visit with me.    Semiology:    Event  "type #1: \"Seizures\".  Year/Age of Onset: 1999.  Initial features: She appears dazed and unresponsive.  Event features: Mild body tensing and shaking.   Post-ictal features: Post-ictal.  Duration: Up to 1-2 minutes per the description.  Frequency: The last event was in October 2024.   Precipitating factors: Systemic infection.     History of status epilepticus: no  History of physical injury related to seizures: no  History of surgery related to epilepsy: no  Family Planning: N/A  Current Driving Status: She is not driving at this time.   Seizure Clusters: No   Longest Seizure Freedom: Not clear.     Pertinent Ancillary Test Results:    MRI brain studies:   - MRI brain - none available for my review.     CT head studies:   - CT head wo contrast - none available for my review.     EEG studies:   - Standard EEG - none available for my review.     Current Antiseizure Medications: Keppra 500 mg BID.     Previously Tried Antiseizure Medications: None.     Review of Systems: No recent fevers. No recent significant weight changes. The mood is overall stable. No SI/HI    Risk Factors For Epilepsy/Seizure Disorder: The patient is a product of normal pregnancy and uncomplicated delivery. The early development was normal and the patient started waking, talking, and engaging in social interaction as expected. There were no challenges during school and no reported attendance of special education programs. There is no history of febrile seizures. There is no history of head trauma. There is a history of stroke. There is no history of CNS infections, such as encephalitis and/or meningitis. There is no history of neurosurgical interventions. There is no reported history of staring spells during childhood/school years.    Past Medical History:  Ischemic stroke (1999 - dyslipidemia and estrogen supplementation). Focal seizure disorder.     Past Surgical History:  Past Surgical History:   Procedure Laterality Date    PB REMOVAL DEEP " IMPLANT Right 12/11/2024    Procedure: RIGHT MIDDLE FINGER IRRIGATION WIRE REMOVAL;  Surgeon: Khari Mendez M.D.;  Location: [facility];  Service: Orthopedics    ABDOMINAL HYSTERECTOMY TOTAL      APPENDECTOMY        Social History:  Social History     Tobacco Use    Smoking status: Unknown   Vaping Use    Vaping status: Never Used   Substance Use Topics    Alcohol use: Never    Drug use: Never     Family History:  Her son has seizure disorder.         1/27/2025     2:30 PM 11/13/2024     3:40 PM 10/23/2024     2:00 PM   PHQ-9 Screening   Little interest or pleasure in doing things 0 - not at all 2 - more than half the days 0 - not at all   Feeling down, depressed, or hopeless 0 - not at all 0 - not at all 0 - not at all   Trouble falling or staying asleep, or sleeping too much  0 - not at all    Feeling tired or having little energy  1 - several days    Poor appetite or overeating  0 - not at all    Feeling bad about yourself - or that you are a failure or have let yourself or your family down  0 - not at all    Trouble concentrating on things, such as reading the newspaper or watching television  0 - not at all    Moving or speaking so slowly that other people could have noticed. Or the opposite - being so fidgety or restless that you have been moving around a lot more than usual  0 - not at all    Thoughts that you would be better off dead, or of hurting yourself in some way  0 - not at all    PHQ-2 Total Score 0 2 0   PHQ-9 Total Score  3      [place] Suicide Severity Rating Scale     Wish to be Dead?: No  Suicidal Thoughts: No    Suicidal Thoughts with Method Without Specific Plan or Intent to Act:    Suicidal Intent Without Specific Plan:    Suicide Intent with Specific Plan:    Suicide Behavior Question: No  How long ago did you do any of these?:    C-SSRS Risk Level: No Risk    Additional Suicide Screening Questions    Suspected or Confirmed Suicide Attempted?: No  Harming  "or killing others?: No    Allergies:  No Known Allergies    Current Medications:    Current Outpatient Medications:     HYDROcodone-acetaminophen, TAKE 1 TABLET BY MOUTH EVERY 6 HOURS AS NEEDED FOR MODERATE PAIN FOR UP TO 3 DAYS MAX 4 TABLETS/DAY, PRN    baclofen, TAKE 0.5-1 TABLETS BY MOUTH EVERY EVENING, Taking    aspirin, 81 mg, Oral, DAILY, Taking    Multiple Vitamins-Minerals (QC WOMENS DAILY MULTIVITAMIN PO), 1 Tablet, Oral, DAILY, Taking    Turmeric (QC TUMERIC COMPLEX PO), 1 Tablet, Oral, DAILY, Taking    amLODIPine, 2.5 mg, Oral, DAILY, Taking    atorvastatin, 20 mg, Oral, QDAY, Taking    levETIRAcetam, 500 mg, Oral, BID, Taking    sertraline, 25 mg, Oral, DAILY, Taking    amoxicillin-clavulanate, 1 Tablet, Oral, BID (Patient not taking: Reported on 1/27/2025), Not Taking    guaiFENesin, 10 mL, Oral, BID PRN (Patient not taking: Reported on 11/13/2024)    Physical Examination:    Ambulatory Vitals  Encounter Vitals  Temperature: 36.7 °C (98.1 °F)  Temp src: Temporal  Blood Pressure : 126/64  Pulse: 62  Respiration: 16  Pulse Oximetry: 97 %  Weight: 54.9 kg (121 lb)  Height: 154.9 cm (5' 1\")  BMI (Calculated): 22.86    Neurological Examination:  Mental Status: The patient is alert and oriented to person, place, time, and situation. Speech is aphasic.     Cranial Nerve Examination:  CN I: Olfaction examination is deferred.  CN II: Visual fields are full to confrontation examination and show no visual field defect.  CN III, IV, VI: Eye movements are normal in all directions. Pupils are reactive to direct and consensual light. There is no relative afferent pupillary defect. There is no nystagmus.  CN V: Facial sensation decreased on the right.   CN VII: Right lower facial weakness.   CN VIII: Hearing intact to rubbing sounds bilaterally.   CN IX, X: Soft palate elevates symmetrically.  CN XI: Weakness on the right.   CN XII: Midline tongue protrusion and moves symmetrically to each side.     Motor " Examination:  Right sided weakness.     Muscle Stretch Reflexes Examination:  Deferred.     Sensory Examination:  Decreased on the right.     Coordination:  Impaired on the right.     Stance/gait:  Hemiparetic gait.     ASSESSMENT AND PLAN:  1. Seizure (HCC)  Clinical presentation is consistent with focal epilepsy.    We will obtain CT head and standard outpatient EEG:  - CT-HEAD W/O; Future  - Referral to Neurodiagnostics (EEG,EP,EMG/NCS/DBS)    We will continue Keppra with no changes at this time. Adverse effects were discussed.   - levETIRAcetam (KEPPRA) 500 MG Tab; Take 1 Tablet by mouth 2 times a day. Indications: Seizure  Dispense: 180 Tablet; Refill: 1    Epilepsy counseling provided in writing.     2. 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  We will reintroduce gabapentin in context of neurogenic pain post-stroke. Adverse effects discussed.   - gabapentin (NEURONTIN) 100 MG Cap; Take 3 Capsules by mouth every evening.  Dispense: 90 Capsule; Refill: 5    We will continue baclofen with no changes.    - baclofen (LIORESAL) 10 MG Tab; Take 1 Tablet by mouth 3 times a day.  Dispense: 90 Tablet; Refill: 5    3. History of stroke  Continue aspirin and statin.    - secondary risk factor modification followed by PCP.     Patient Instructions   Please continue Keppra 500 mg twice daily.     Please take gabapentin 300 mg every night. You may decrease the dose to 200 mg nightly if you are too sleepy.     Please continue Baclofen with no changes.    Please let our office know if you have any changes in your seizure frequency and/characteristics. Otherwise, please keep the diary of your events and bring it with you at the time of your next follow up visit with our office.     Please take vitamin D3 7415-0501 internation units daily.     Please note that the following might precipitate seizures: missed doses of antiseizure medications, being sick with fever, stress, fatigue,  sleep deprivation, not eating regularly, not drinking enough water, drinking too much alcohol, stopping alcohol suddenly if you are currently using it on a regular/daily basis, and/or using recreational drugs, among others.    Please note that the following might lead to an injury, potentially a life-threatening injury, in case you have a seizure and/or lose awareness while:   - being in a large body of water by yourself, such as bath, pool, lake, ocean, among others (risk of drowning)   - being on unprotected heights (risk of fall)   - being around and/or operating heavy machinery (risk of injury)   - being around open fire/hot surfaces (risk of burns)   - any other activities/circumstances, in which if you lose awareness, you might injure yourself and/or others.    Please call for help (crisis line and/or 911) in case you have thoughts of harming yourself and/or others.    Please abstain from driving until further notice.    ------------------------------------------------------------------------------------------  Instructions for your family/caregivers:  Please call 911 if the patient has a seizure longer than 2-3 minutes, if seizures are back to back without him recovering to his baseline, or he does not start recovering within 5-10 minutes after the seizure stops. During the seizure - please turn him on his side, please make sure his head is protected (for example, you should put a pillow under his head, if one is available), and please do not put anything in his mouth.   -------------------------------------------------------------------------------------------    It is important that your seizures are well controlled and you have none or have them rarely. In addition to avoiding injury related to breakthrough seizures, frequent seizures increase risk of SUDEP (sudden unexpected death in epilepsy), where a person goes into a seizure and then never wakes up - this is a rare complication of seizure disorder;  one of the best available ways to prevent it is to control your seizures well.     Due to the high volume of patients we are trying to help, your physician will not be able to respond by phone or in MyChart to your routine concerns between appointments.  This does not reflect a lack of interest or concern for you or your diagnosis.  Please bring these questions and concerns to your appointment where your physician can answer.  Please relay more pressing concerns to our office, either via MyChart, or by phone; if not able to reach us please visit nearby Urgent Care Center or Emergency Department.  If any emergent medical needs, please seek emergent medical help and/or call 911.    Please note that we are not able to fill out paperwork that might be related to your work, utility company, disability, and/or driving, among others, in between the visits.  Please schedule a dedicated appointment to address your paperwork, so we can do that in a timely manner.  This is not due to lack of concern or interest for your disease-related work/administrative problems, but to make sure that we provide the best possible care and to fill out your paperwork in a correct and timely manner.    Thank you for entrusting your neurological care to [facility] and we look forward to continuing to serve you.     Follow up in 3 months.     Sergey Reaves MD  Outpatient Neurology   [facility]

## 2025-02-10 ENCOUNTER — HOSPITAL ENCOUNTER (OUTPATIENT)
Dept: RADIOLOGY | Facility: MEDICAL CENTER | Age: 75
End: 2025-02-10
Attending: PSYCHIATRY & NEUROLOGY
Payer: MEDICARE

## 2025-02-10 DIAGNOSIS — R56.9 SEIZURE (HCC): ICD-10-CM

## 2025-02-10 PROCEDURE — 70450 CT HEAD/BRAIN W/O DYE: CPT

## 2025-02-14 DIAGNOSIS — E78.2 MIXED HYPERLIPIDEMIA: ICD-10-CM

## 2025-02-14 DIAGNOSIS — I10 PRIMARY HYPERTENSION: ICD-10-CM

## 2025-02-14 RX ORDER — ATORVASTATIN CALCIUM 20 MG/1
20 TABLET, FILM COATED ORAL
Qty: 90 TABLET | Refills: 1 | Status: SHIPPED | OUTPATIENT
Start: 2025-02-14 | End: 2025-02-21 | Stop reason: SDUPTHER

## 2025-02-14 RX ORDER — AMLODIPINE BESYLATE 2.5 MG/1
2.5 TABLET ORAL DAILY
Qty: 90 TABLET | Refills: 1 | Status: SHIPPED | OUTPATIENT
Start: 2025-02-14 | End: 2025-02-21 | Stop reason: SDUPTHER

## 2025-02-21 DIAGNOSIS — I10 PRIMARY HYPERTENSION: ICD-10-CM

## 2025-02-21 DIAGNOSIS — E78.2 MIXED HYPERLIPIDEMIA: ICD-10-CM

## 2025-02-21 RX ORDER — AMLODIPINE BESYLATE 2.5 MG/1
2.5 TABLET ORAL DAILY
Qty: 90 TABLET | Refills: 1 | Status: SHIPPED | OUTPATIENT
Start: 2025-02-21

## 2025-02-21 RX ORDER — ATORVASTATIN CALCIUM 20 MG/1
20 TABLET, FILM COATED ORAL
Qty: 90 TABLET | Refills: 1 | Status: SHIPPED | OUTPATIENT
Start: 2025-02-21

## 2025-02-26 ENCOUNTER — OFFICE VISIT (OUTPATIENT)
Dept: MEDICAL GROUP | Facility: MEDICAL CENTER | Age: 75
End: 2025-02-26
Payer: MEDICARE

## 2025-02-26 VITALS
SYSTOLIC BLOOD PRESSURE: 134 MMHG | BODY MASS INDEX: 21.23 KG/M2 | TEMPERATURE: 97 F | OXYGEN SATURATION: 95 % | WEIGHT: 112.43 LBS | HEIGHT: 61 IN | DIASTOLIC BLOOD PRESSURE: 64 MMHG | HEART RATE: 69 BPM

## 2025-02-26 DIAGNOSIS — Z23 NEED FOR VACCINATION: ICD-10-CM

## 2025-02-26 DIAGNOSIS — Z12.11 SCREEN FOR COLON CANCER: ICD-10-CM

## 2025-02-26 DIAGNOSIS — E78.2 MIXED HYPERLIPIDEMIA: ICD-10-CM

## 2025-02-26 DIAGNOSIS — R56.9 SEIZURE (HCC): ICD-10-CM

## 2025-02-26 DIAGNOSIS — Z00.00 MEDICARE ANNUAL WELLNESS VISIT, SUBSEQUENT: ICD-10-CM

## 2025-02-26 DIAGNOSIS — R25.2 SPASTICITY: ICD-10-CM

## 2025-02-26 ASSESSMENT — ACTIVITIES OF DAILY LIVING (ADL)
BATHING_REQUIRES_ASSISTANCE: 0
TRANSPORTATION COMMENTS: PT DOESN'T DRIVE

## 2025-02-26 ASSESSMENT — PATIENT HEALTH QUESTIONNAIRE - PHQ9: CLINICAL INTERPRETATION OF PHQ2 SCORE: 0

## 2025-02-26 ASSESSMENT — FIBROSIS 4 INDEX: FIB4 SCORE: 2.15

## 2025-02-26 ASSESSMENT — ENCOUNTER SYMPTOMS: GENERAL WELL-BEING: GOOD

## 2025-02-26 NOTE — PROGRESS NOTES
Chief Complaint   Patient presents with    Lab Results    Annual Exam       HPI:  Toshia Kulkarni is a 74 y.o. here for Medicare Annual Wellness Visit   History of Present Illness  The patient presents for an annual exam. She is accompanied by her son.    She has a history of seizures, with the most recent episode occurring in 09/2024, necessitating an emergency room visit and subsequent referral to a neurologist. She was diagnosed with seizures a few years ago. She does not drive. She reports no recent falls, depression, or changes in memory, voice, or vision. She reports no urinary or bowel issues, including burning with urination, blood in urine, blood in stool, nausea, constipation, or diarrhea. She maintains good balance and reports no presence of small animals at home, rugs, or second-story stairs. She reports no mood swings and maintains a positive outlook. She reports no bladder infection concerns. She reports no issues with energy levels. She has not yet completed her colon cancer screening. She has not undergone a bone density test. She is open to receiving any necessary vaccinations. She retains most of her teeth and acknowledges the need for dental maintenance. She resides with her son, daughter-in-law, and grandchildren, and is able to perform household chores such as dishwashing. She uses a cane for mobility and engages in regular exercise. She reports feeling stronger and in a better mental state. She consumes a caffeinated beverage in the morning. She is currently on Keppra 500 mg twice daily and under the care of a neurologist.    She has spasticity. She is also taking Neurontin 300 mg nightly for spasticity, with a current regimen of one dose in the morning and one at night.    She has been experiencing elevated blood pressure, which may be due to missed doses of her antihypertensive medication. A recent CT scan was performed, but she did not fully understand the results.    Supplemental  Information  She had a hand surgery and everything is fine now. They took out all the wiring and removed the infection. At first, they thought they were going to amputate it, but they managed to clean and remove everything. If there are any more problems, they can call Dr. Daniel. She is going to see him back. She had surgery on her right hand, but it does not work. The tendons have been cut because it was curling up a long time ago, so there is a bar that goes down the finger.    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 Indications: High Blood Pressure 90 Tablet 1    atorvastatin (LIPITOR) 20 MG Tab Take 1 Tablet by mouth every day. Indications: High Amount of Fats in the Blood 90 Tablet 1    levETIRAcetam (KEPPRA) 500 MG Tab Take 1 Tablet by mouth 2 times a day. Indications: Seizure 180 Tablet 1    baclofen (LIORESAL) 10 MG Tab Take 1 Tablet by mouth 3 times a day. 90 Tablet 5    gabapentin (NEURONTIN) 100 MG Cap Take 3 Capsules by mouth every evening. 90 Capsule 5    HYDROcodone-acetaminophen (NORCO) 5-325 MG Tab per tablet TAKE 1 TABLET BY MOUTH EVERY 6 HOURS AS NEEDED FOR MODERATE PAIN FOR UP TO 3 DAYS MAX 4 TABLETS/DAY      amoxicillin-clavulanate (AUGMENTIN) 875-125 MG Tab Take 1 Tablet by mouth 2 times a day. (Patient not taking: Reported on 1/27/2025) 20 Tablet 0    aspirin (ASA) 325 MG Tab Take 81 mg by mouth every day.  Indications: BLOOD THINNER      Multiple Vitamins-Minerals (QC WOMENS DAILY MULTIVITAMIN PO) Take 1 Tablet by mouth every day. Indications: SUPPLEMENT      Turmeric (QC TUMERIC COMPLEX PO) Take 1 Tablet by mouth every day. TUMERIC CURCUMIN + GINGER GUMMIES  1,350 MG TUMERIC  750 MG GINGER  Indications: SUPPLEMENT      guaiFENesin (ROBITUSSIN) 100 MG/5ML liquid Take 10 mL by mouth 2 times a day as needed for Cough. Indications: Cough (Patient not taking: Reported on 11/13/2024)      sertraline (ZOLOFT) 25 MG tablet Take 1 Tablet by mouth every day. 90 Tablet 1     No current facility-administered medications for this visit.          Current supplements as per medication list.     Allergies: Patient has no known allergies.    Current social contact/activities: live with son and son;'s family. Active    She  reports that she does not drink alcohol and does not use drugs.  Counseling given: Not Answered      ROS:    Gait: Uses a cane  Ostomy: No  Other tubes: No  Amputations: No  Chronic oxygen use: No  Last eye exam: over a year   Wears hearing aids: No   : Denies any urinary leakage during the last 6 months    Screening:    Depression Screening  Little interest or pleasure in doing things?  0 - not at all  Feeling down, depressed , or hopeless? 0 - not at all  Patient Health Questionnaire Score: 0     If depressive symptoms identified deferred to follow up visit unless specifically addressed in assessment and plan.    Interpretation of PHQ-9 Total Score   Score Severity   1-4 No Depression   5-9 Mild Depression   10-14 Moderate Depression   15-19 Moderately Severe Depression   20-27 Severe Depression    Screening for Cognitive Impairment  Do you or any of your friends or family members have any concern about your memory? No  Three Minute Recall (Village, Kitchen, Baby)  /3    José Miguel clock face with all 12 numbers and set the hands to show 10 minutes past 11.       Cognitive concerns identified deferred for follow up  unless specifically addressed in assessment and plan.    Fall Risk Assessment  Has the patient had two or more falls in the last year or any fall with injury in the last year?  No    Safety Assessment  Do you always wear your seatbelt?  Yes  Any changes to home needed to function safely? No  Difficulty hearing.  No  Patient counseled about all safety risks that were identified.    Functional Assessment ADLs  Are there any barriers preventing you from cooking for yourself or meeting nutritional needs?  No.    Are there any barriers preventing you from driving safely or obtaining transportation?  Yes. Pt doesn't drive   Are there any barriers preventing you from using a telephone or calling for help?  No    Are there any barriers preventing you from shopping?  Yes.    Are there any barriers preventing you from taking care of your own finances?  No    Are there any barriers preventing you from managing your medications?  No    Are there any barriers preventing you from showering, bathing or dressing yourself? No    Are there any barriers preventing you from doing housework or laundry? No  Are there any barriers preventing you from using the toilet?No  Are you currently engaging in any exercise or physical activity?  Yes.      Self-Assessment of Health  What is your perception of your health? Good    Do you sleep more than six hours a night? Yes    In the past 7 days, how much did pain keep you from doing your normal work? None    Do you spend quality time with family or friends (virtually or in person)? Yes    Do you usually eat a heart healthy diet that constists of a variety of fruits, vegetables, whole grains and fiber? Yes    Do you eat foods high in fat and/or Fast Food more than three times per week? No    How concerned are you that your medical conditions are not being well managed? Not at all    Are you worried that in the next 2 months, you may not have stable housing that you own, rent, or stay in as part of  a household? No      Advance Care Planning  Do you have an Advance Directive, Living Will, Durable Power of , or POLST?                   Health Maintenance Summary            Current Care Gaps       Bone Density Scan (Every 5 Years) Never done      10/23/2024  Order placed for DS-BONE DENSITY STUDY (DEXA) by LUKE Sanchez              Annual Wellness Visit (Yearly) Never done     No completion history exists for this topic.              Hepatitis C Screening (Once) Never done      10/23/2024  Order placed for HEP C VIRUS ANTIBODY by LUKE Sanchez              IMM DTaP/Tdap/Td Vaccine (1 - Tdap) Never done     No completion history exists for this topic.              Colorectal Cancer Screening (View Topic Details) Never done      10/23/2024  Order placed for Cologuard® colon cancer screening by LUKE Sanchez              Zoster (Shingles) Vaccines (1 of 2) Never done     No completion history exists for this topic.              COVID-19 Vaccine (1 - 2024-25 season) Never done     No completion history exists for this topic.                      Upcoming       Pneumococcal Vaccine: 50+ Years (2 of 2 - PPSV23) Next due on 10/23/2025      10/23/2024  Imm Admin: Pneumococcal Conjugate Vaccine (Prevnar/PCV-13)                      Completed or No Longer Recommended       Influenza Vaccine (Series Information) Completed      10/23/2024  Imm Admin: Influenza high-dose trivalent (PF)              Hepatitis A Vaccine (Hep A) (Series Information) Aged Out     No completion history exists for this topic.              Hepatitis B Vaccine (Hep B) (Series Information) Aged Out     No completion history exists for this topic.              HPV Vaccines (Series Information) Aged Out     No completion history exists for this topic.              Polio Vaccine (Inactivated Polio) (Series Information) Aged Out     No completion history exists for this topic.              Meningococcal  "Immunization (Series Information) Aged Out     No completion history exists for this topic.                            Patient Care Team:  LUKE Sanchez as PCP - General (Family Medicine)        Social History     Tobacco Use    Smoking status: Unknown   Vaping Use    Vaping status: Never Used   Substance Use Topics    Alcohol use: Never    Drug use: Never     No family history on file.  She  has no past medical history on file.   Past Surgical History:   Procedure Laterality Date    PB REMOVAL DEEP IMPLANT Right 12/11/2024    Procedure: RIGHT MIDDLE FINGER IRRIGATION WIRE REMOVAL;  Surgeon: Khari Mendez M.D.;  Location: [facility];  Service: Orthopedics    ABDOMINAL HYSTERECTOMY TOTAL      APPENDECTOMY         Exam:   /64   Pulse 69   Temp 36.1 °C (97 °F) (Temporal)   Ht 1.549 m (5' 1\")   Wt 51 kg (112 lb 7 oz)   SpO2 95%  Body mass index is 21.24 kg/m².    Hearing good.    Dentition good  Alert, oriented in no acute distress.  Eye contact is good, speech goal directed, affect calm    Assessment and Plan. The following treatment and monitoring plan is recommended:    There are no diagnoses linked to this encounter.  Assessment & Plan  1. Annual medicare examination.  Her overall health status is satisfactory, with well-managed conditions and a fully healed hand. Her blood pressure readings are within the normal range, with a baseline in the mid-120s and a current reading of 134, which does not raise any concerns. She has been advised to maintain adequate hydration and consume electrolytes. A sublingual B12 supplement has been recommended for morning intake to boost energy levels. She has been instructed to monitor her blood pressure sporadically, ensuring she is relaxed during the process, and to record the readings. A Cologuard test will be conducted for colon cancer screening. A bone density test will be ordered. She will receive a tetanus vaccine today. " A shingles vaccine will be administered at a pharmacy. Laboratory tests will be ordered for completion prior to her next visit. She has been advised to request any necessary medication refills through her pharmacy.    2. Seizure disorder.  Chronic and stable condition. She has a history of seizures and is currently on Keppra 500 mg twice a day. She reports no recent seizures since September. She does not drive and is under the care of a neurologist. She has been advised to continue her current medication regimen and follow up with her neurologist as scheduled.    3. Spasticity.  Stable condition.  She is on Neurontin (gabapentin) 300 mg nightly for spasticity, currently taking one dose in the morning and one at night. She has been advised to continue this regimen and monitor for any changes in symptoms.    4. Hypertension.  Chronic and stable condition.  On amlodipine 2.5 mg.  Compliant.  Continue taking.  Her blood pressure is well-controlled, with a baseline in the mid-120s and a current reading of 134, which is not concerning. She has been advised to continue her current antihypertensive medication regimen and monitor her blood pressure sporadically, ensuring she is relaxed during the process, and to record the readings.    Follow-up  The patient will follow up in 6 months.    PROCEDURE  The patient underwent a hand surgery where all the wiring was removed and the infection was cleared.     Services suggested: No services needed at this time  Health Care Screening: Age-appropriate preventive services recommended by USPTF and ACIP covered by Medicare were discussed today. Services ordered if indicated and agreed upon by the patient.  Referrals offered: Community-based lifestyle interventions to reduce health risks and promote self-management and wellness, fall prevention, nutrition, physical activity, tobacco-use cessation, weight loss, and mental health services as per orders if indicated.    Discussion today  about general wellness and lifestyle habits:    Prevent falls and reduce trip hazards; Cautioned about securing or removing rugs.  Have a working fire alarm and carbon monoxide detector;   Engage in regular physical activity and social activities     Follow-up: No follow-ups on file.

## 2025-05-05 ENCOUNTER — OFFICE VISIT (OUTPATIENT)
Dept: NEUROLOGY | Facility: MEDICAL CENTER | Age: 75
End: 2025-05-05
Attending: PSYCHIATRY & NEUROLOGY
Payer: MEDICARE

## 2025-05-05 VITALS
DIASTOLIC BLOOD PRESSURE: 66 MMHG | HEIGHT: 61 IN | BODY MASS INDEX: 21.52 KG/M2 | WEIGHT: 114 LBS | TEMPERATURE: 97.3 F | HEART RATE: 55 BPM | RESPIRATION RATE: 16 BRPM | OXYGEN SATURATION: 97 % | SYSTOLIC BLOOD PRESSURE: 144 MMHG

## 2025-05-05 DIAGNOSIS — R56.9 SEIZURE (HCC): ICD-10-CM

## 2025-05-05 DIAGNOSIS — R25.2 SPASTICITY: ICD-10-CM

## 2025-05-05 DIAGNOSIS — I69.398 NEUROGENIC PAIN DUE TO CENTRAL NERVOUS SYSTEM ABNORMALITY FOLLOWING STROKE: ICD-10-CM

## 2025-05-05 DIAGNOSIS — Z86.73 HISTORY OF STROKE: ICD-10-CM

## 2025-05-05 DIAGNOSIS — M79.2 NEUROGENIC PAIN DUE TO CENTRAL NERVOUS SYSTEM ABNORMALITY FOLLOWING STROKE: ICD-10-CM

## 2025-05-05 PROCEDURE — 3078F DIAST BP <80 MM HG: CPT | Performed by: PSYCHIATRY & NEUROLOGY

## 2025-05-05 PROCEDURE — 3077F SYST BP >= 140 MM HG: CPT | Performed by: PSYCHIATRY & NEUROLOGY

## 2025-05-05 PROCEDURE — 99214 OFFICE O/P EST MOD 30 MIN: CPT | Performed by: PSYCHIATRY & NEUROLOGY

## 2025-05-05 RX ORDER — BACLOFEN 10 MG/1
10 TABLET ORAL 3 TIMES DAILY
Qty: 90 TABLET | Refills: 5 | Status: SHIPPED | OUTPATIENT
Start: 2025-05-05

## 2025-05-05 RX ORDER — GABAPENTIN 100 MG/1
300 CAPSULE ORAL NIGHTLY
Qty: 90 CAPSULE | Refills: 5 | Status: SHIPPED | OUTPATIENT
Start: 2025-05-05

## 2025-05-05 RX ORDER — LEVETIRACETAM 750 MG/1
750 TABLET ORAL 2 TIMES DAILY
Qty: 60 TABLET | Refills: 5 | Status: SHIPPED | OUTPATIENT
Start: 2025-05-05

## 2025-05-05 ASSESSMENT — PATIENT HEALTH QUESTIONNAIRE - PHQ9: CLINICAL INTERPRETATION OF PHQ2 SCORE: 0

## 2025-05-05 ASSESSMENT — FIBROSIS 4 INDEX: FIB4 SCORE: 2.15

## 2025-05-05 NOTE — Clinical Note
REFERRAL APPROVAL NOTICE         Sent on May 5, 2025                   Toshia Kulkarni  [address] [place] [address]                   Dear Ms. Kulkarni,    After a careful review of the medical information and benefit coverage, [facility] has processed your referral. See below for additional details.    If applicable, you must be actively enrolled with your insurance for coverage of the authorized service. If you have any questions regarding your coverage, please contact your insurance directly.    REFERRAL INFORMATION   Referral #:  [id number]  Referred-To Department    Referred-By Provider:  Physical Therapy    Sergey Reaves M.D.   Phys Therapy Op [facility]      [address]  [address]  [place] [id number] [phone] [address]  [place] [address] [phone]    Referral Start Date:  05/05/2025  Referral End Date:   05/05/2026             SCHEDULING  If you do not already have an appointment, please call [phone] to make an appointment.     MORE INFORMATION  If you do not already have a "[employer] account, sign up at: [email or web address]  You can access your medical information, make appointments, see lab results, billing information, and more.  If you have questions regarding this referral, please contact  the [facility] Referrals department at:             [phone]. Monday - Friday 8:00AM - 5:00PM.     Sincerely,    [facility]

## 2025-05-05 NOTE — PROGRESS NOTES
[facility] Epilepsy Program  Follow Up Visit      Patient's Name: Toshia Kulkarni  YOB: 1950  MRN: [id number]  Date of Service: 05/05/25    Referring Provider: Deny Turner M.D.  [address] [place] [address] Emergency Room  [address]  [place] [id number]    Chief Concern: Seizures.     The patient presents for a follow up visit. The patient presents with her son and provides verbal consent for him to be present and provide additional information as needed.     HPI (as obtained at the time of the initial visit and updated as necessary): The patient is a 74 y.o., left-handed female, who initially presented to my epilepsy clinic for evaluation of seizures on 01/27/2025.    The patient experienced genital tract challenges in late 1980s/1990s which led to hysterectomy.  In that context, she was receiving estrogen supplements after the surgery.  She also had dyslipidemia, of which she was unaware at the time.  She experienced what appeared to be a left hemispheric ischemic stroke in 1999, with the etiology of the stroke deemed to be related to estrogen supplementation and dyslipidemia.  No other risk factors were found as per the patient's son.    The patient experienced two initial seizures in 1999 but these were of unclear nature.  She was not started on antiseizure medications, and she remained seizure until around 2023, when she experienced a seizure in the physician office.  She was started on Keppra at that time.  She remained stable on Keppra until October 2024, when she had breakthrough focal to bilateral tonic-clonic seizure, in context of a procedure.  Her infection resolved, and as of the time of the initial visit with me, she had no further seizures.    She denies ever experiencing sensation of strange smell/taste/gastric uprising, nor clear psychic/autonomic phenomena.    She does have significant pain and stiffness on the right side of her body, which was in the past treated with gabapentin, but  "for some reason gabapentin was stopped prior to the initial visit with me.    Semiology:    Event type #1: \"Seizures\".  Year/Age of Onset: 1999.  Initial features: She appears dazed and unresponsive.  Event features: Mild body tensing and shaking.   Post-ictal features: Post-ictal.  Duration: Up to 1-2 minutes per the description.  Frequency: The last event was in October 2024.   Precipitating factors: Systemic infection.     History of status epilepticus: no  History of physical injury related to seizures: no  History of surgery related to epilepsy: no  Family Planning: N/A  Current Driving Status: She is not driving at this time.   Seizure Clusters: No   Longest Seizure Freedom: Not clear.     Pertinent Ancillary Test Results:    MRI brain studies:   - MRI brain - none available for my review.     CT head studies:   - CT head wo contrast (02/10/2025): \"IMPRESSION:   1.  Old infarction left cerebral hemisphere in the distribution of left middle cerebral artery.  2.  Ex vacuo dilatation of the left lateral ventricle. No hydrocephalus.  3.  No acute intracranial hemorrhage.  4.  Cerebral atrophy.  5.  Embolization material anterior to the suprasellar cistern.\"    EEG studies:   - Standard EEG - none available for my review.     INTERIM HISTORY (05/05/2025): The patient unfortunately had seizures in late April 2025; she had two confusional spells on the same day. She had another confusional spell several days later. No convulsions nor episodes of waking up in the morning sore/wetted/with blood in her mouth. Except stress, no other precipitating factors.     She is taking Keppra 500 mg BID regularly and no adverse effects from it.     Spasticity and neuropathic pain got worse over the last 3 months.     Current Antiseizure Medications: Keppra 500 mg BID.     Previously Tried Antiseizure Medications: None.     Review of Systems: No recent fevers. She lost 7 lbs in the interim (it appears she is more active). The mood is " overall stable. No SI/HI.    Risk Factors For Epilepsy/Seizure Disorder: The patient is a product of normal pregnancy and uncomplicated delivery. The early development was normal and the patient started waking, talking, and engaging in social interaction as expected. There were no challenges during school and no reported attendance of special education programs. There is no history of febrile seizures. There is no history of head trauma. There is a history of stroke. There is no history of CNS infections, such as encephalitis and/or meningitis. There is no history of neurosurgical interventions. There is no reported history of staring spells during childhood/school years.    Past Medical History:  Ischemic stroke (1999 - dyslipidemia and estrogen supplementation). Focal seizure disorder.     Past Surgical History:  Past Surgical History:   Procedure Laterality Date    PB REMOVAL DEEP IMPLANT Right 12/11/2024    Procedure: RIGHT MIDDLE FINGER IRRIGATION WIRE REMOVAL;  Surgeon: Khari Mendez M.D.;  Location: [facility];  Service: Orthopedics    ABDOMINAL HYSTERECTOMY TOTAL      APPENDECTOMY        Social History:  Social History     Tobacco Use    Smoking status: Unknown   Vaping Use    Vaping status: Never Used   Substance Use Topics    Alcohol use: Never    Drug use: Never     Family History:  Her son has seizure disorder.         5/5/2025     2:30 PM 2/26/2025     9:00 AM 1/27/2025     2:30 PM 11/13/2024     3:40 PM 10/23/2024     2:00 PM   PHQ-9 Screening   Little interest or pleasure in doing things 0 - not at all 0 - not at all 0 - not at all 2 - more than half the days 0 - not at all   Feeling down, depressed, or hopeless 0 - not at all 0 - not at all 0 - not at all 0 - not at all 0 - not at all   Trouble falling or staying asleep, or sleeping too much    0 - not at all    Feeling tired or having little energy    1 - several days    Poor appetite or overeating    0 - not at  "all    Feeling bad about yourself - or that you are a failure or have let yourself or your family down    0 - not at all    Trouble concentrating on things, such as reading the newspaper or watching television    0 - not at all    Moving or speaking so slowly that other people could have noticed. Or the opposite - being so fidgety or restless that you have been moving around a lot more than usual    0 - not at all    Thoughts that you would be better off dead, or of hurting yourself in some way    0 - not at all    PHQ-2 Total Score 0 0 0 2 0   PHQ-9 Total Score    3      Cherry Suicide Reassessment  New or continued thoughts about killing self?: No  Preparing to end life?: No    Allergies:  No Known Allergies    Current Medications:    Current Outpatient Medications:     amLODIPine, 2.5 mg, Oral, DAILY, Taking    atorvastatin, 20 mg, Oral, QDAY, Taking    levETIRAcetam, 500 mg, Oral, BID, Taking    baclofen, 10 mg, Oral, TID, Taking    gabapentin, 300 mg, Oral, Nightly, Taking    aspirin, 81 mg, Oral, DAILY, Taking    Multiple Vitamins-Minerals (QC WOMENS DAILY MULTIVITAMIN PO), 1 Tablet, Oral, DAILY, Taking    sertraline, 25 mg, Oral, DAILY, Taking    HYDROcodone-acetaminophen, TAKE 1 TABLET BY MOUTH EVERY 6 HOURS AS NEEDED FOR MODERATE PAIN FOR UP TO 3 DAYS MAX 4 TABLETS/DAY (Patient not taking: Reported on 5/5/2025), Not Taking    amoxicillin-clavulanate, 1 Tablet, Oral, BID (Patient not taking: Reported on 5/5/2025), Not Taking    Turmeric (QC TUMERIC COMPLEX PO), 1 Tablet, Oral, DAILY (Patient not taking: Reported on 5/5/2025), Not Taking    guaiFENesin, 10 mL, Oral, BID PRN (Patient not taking: Reported on 11/11/2024), Not Taking    Physical Examination:    Ambulatory Vitals  Encounter Vitals  Temperature: 36.3 °C (97.3 °F)  Temp src: Temporal  Blood Pressure : (!) 144/66  Pulse: (!) 55  Respiration: 16  Pulse Oximetry: 97 %  Weight: 51.7 kg (114 lb)  Height: 154.9 cm (5' 1\")  BMI (Calculated): " 21.54    Neurological Examination:  Mental Status: The patient is alert and oriented to person, place, time, and situation. Speech is aphasic.     Cranial Nerve Examination:  CN I: Olfaction examination is deferred.  CN II: Visual fields are full to confrontation examination and show no visual field defect.  CN III, IV, VI: Eye movements are normal in all directions. Pupils are reactive to direct and consensual light. There is no relative afferent pupillary defect. There is no nystagmus.  CN V: Facial sensation decreased on the right.   CN VII: Right lower facial weakness.   CN VIII: Hearing intact to rubbing sounds bilaterally.   CN IX, X: Soft palate elevates symmetrically.  CN XI: Weakness on the right.   CN XII: Midline tongue protrusion and moves symmetrically to each side.     Motor Examination:  Right sided weakness.     Muscle Stretch Reflexes Examination:  Deferred.     Sensory Examination:  Decreased on the right.     Coordination:  Impaired on the right.     Stance/gait:  Hemiparetic gait.     ASSESSMENT AND PLAN:  1. Seizure (HCC)  Clinical presentation is consistent with focal epilepsy.    We reviewed CT head with no contrast - stable.     In context of her several focal breakthrough seizures, we will increase Keppra to 750 mg BID. Refills provided. No adverse effects to Keppra to date.  - levetiracetam (KEPPRA) 750 MG tablet; Take 1 Tablet by mouth 2 times a day.  Dispense: 60 Tablet; Refill: 5    Spot EEG is still needed. If her seizures continue, we will plan for 3-day ambulatory EEG and further increase in Keppra.     Epilepsy counseling provided in writing.     2. 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  We will reintroduce gabapentin in context of neurogenic pain post-stroke. Adverse effects discussed.   - gabapentin (NEURONTIN) 100 MG Cap; Take 3 Capsules by mouth every evening.  Dispense: 90 Capsule; Refill: 5    We will continue baclofen with no  changes.    - baclofen (LIORESAL) 10 MG Tab; Take 1 Tablet by mouth 3 times a day.  Dispense: 90 Tablet; Refill: 5    Referral to Physical Therapy for worsening spasticity.     3. History of stroke  Continue aspirin and statin.    - secondary risk factor modification followed by PCP.     Follow up with PCP for elevated blood pressure and weight loss.     Patient Instructions   Please continue Keppra 750 mg twice daily.     Please take gabapentin 300 mg every night. You may decrease the dose to 200 mg nightly if you are too sleepy.     Please continue Baclofen with no changes.    Please let our office know if you have any changes in your seizure frequency and/characteristics. Otherwise, please keep the diary of your events and bring it with you at the time of your next follow up visit with our office.     Please take vitamin D3 6089-0385 internation units daily.     Please note that the following might precipitate seizures: missed doses of antiseizure medications, being sick with fever, stress, fatigue, sleep deprivation, not eating regularly, not drinking enough water, drinking too much alcohol, stopping alcohol suddenly if you are currently using it on a regular/daily basis, and/or using recreational drugs, among others.    Please note that the following might lead to an injury, potentially a life-threatening injury, in case you have a seizure and/or lose awareness while:   - being in a large body of water by yourself, such as bath, pool, lake, ocean, among others (risk of drowning)   - being on unprotected heights (risk of fall)   - being around and/or operating heavy machinery (risk of injury)   - being around open fire/hot surfaces (risk of burns)   - any other activities/circumstances, in which if you lose awareness, you might injure yourself and/or others.    Please call for help (crisis line and/or 911) in case you have thoughts of harming yourself and/or others.    Please abstain from driving until further  notice.    ------------------------------------------------------------------------------------------  Instructions for your family/caregivers:  Please call 911 if the patient has a seizure longer than 2-3 minutes, if seizures are back to back without him recovering to his baseline, or he does not start recovering within 5-10 minutes after the seizure stops. During the seizure - please turn him on his side, please make sure his head is protected (for example, you should put a pillow under his head, if one is available), and please do not put anything in his mouth.   -------------------------------------------------------------------------------------------    It is important that your seizures are well controlled and you have none or have them rarely. In addition to avoiding injury related to breakthrough seizures, frequent seizures increase risk of SUDEP (sudden unexpected death in epilepsy), where a person goes into a seizure and then never wakes up - this is a rare complication of seizure disorder; one of the best available ways to prevent it is to control your seizures well.     Due to the high volume of patients we are trying to help, your physician will not be able to respond by phone or in [employer] to your routine concerns between appointments.  This does not reflect a lack of interest or concern for you or your diagnosis.  Please bring these questions and concerns to your appointment where your physician can answer.  Please relay more pressing concerns to our office, either via [employer], or by phone; if not able to reach us please visit nearby Urgent Care Center or Emergency Department.  If any emergent medical needs, please seek emergent medical help and/or call 911.    Please note that we are not able to fill out paperwork that might be related to your work, utility company, disability, and/or driving, among others, in between the visits.  Please schedule a dedicated appointment to address your paperwork, so  we can do that in a timely manner.  This is not due to lack of concern or interest for your disease-related work/administrative problems, but to make sure that we provide the best possible care and to fill out your paperwork in a correct and timely manner.    Thank you for entrusting your neurological care to [facility] and we look forward to continuing to serve you.     Follow up in 3 months.     Sergey Reaves MD  Outpatient Neurology   [facility]

## 2025-05-05 NOTE — PATIENT INSTRUCTIONS
Please continue Keppra 750 mg twice daily.     Please take gabapentin 300 mg every night. You may decrease the dose to 200 mg nightly if you are too sleepy.     Please continue Baclofen with no changes.    Please let our office know if you have any changes in your seizure frequency and/characteristics. Otherwise, please keep the diary of your events and bring it with you at the time of your next follow up visit with our office.     Please take vitamin D3 [id number] internation units daily.     Please note that the following might precipitate seizures: missed doses of antiseizure medications, being sick with fever, stress, fatigue, sleep deprivation, not eating regularly, not drinking enough water, drinking too much alcohol, stopping alcohol suddenly if you are currently using it on a regular/daily basis, and/or using recreational drugs, among others.    Please note that the following might lead to an injury, potentially a life-threatening injury, in case you have a seizure and/or lose awareness while:   - being in a large body of water by yourself, such as bath, pool, lake, ocean, among others (risk of drowning)   - being on unprotected heights (risk of fall)   - being around and/or operating heavy machinery (risk of injury)   - being around open fire/hot surfaces (risk of burns)   - any other activities/circumstances, in which if you lose awareness, you might injure yourself and/or others.    Please call for help (crisis line and/or 911) in case you have thoughts of harming yourself and/or others.    Please abstain from driving until further notice.    ------------------------------------------------------------------------------------------  Instructions for your family/caregivers:  Please call 911 if the patient has a seizure longer than 2-3 minutes, if seizures are back to back without him recovering to his baseline, or he does not start recovering within 5-10 minutes after the seizure stops. During the seizure -  please turn him on his side, please make sure his head is protected (for example, you should put a pillow under his head, if one is available), and please do not put anything in his mouth.   -------------------------------------------------------------------------------------------    It is important that your seizures are well controlled and you have none or have them rarely. In addition to avoiding injury related to breakthrough seizures, frequent seizures increase risk of SUDEP (sudden unexpected death in epilepsy), where a person goes into a seizure and then never wakes up - this is a rare complication of seizure disorder; one of the best available ways to prevent it is to control your seizures well.     Due to the high volume of patients we are trying to help, your physician will not be able to respond by phone or in [employer] to your routine concerns between appointments.  This does not reflect a lack of interest or concern for you or your diagnosis.  Please bring these questions and concerns to your appointment where your physician can answer.  Please relay more pressing concerns to our office, either via [employer], or by phone; if not able to reach us please visit nearby Urgent Care Center or Emergency Department.  If any emergent medical needs, please seek emergent medical help and/or call 911.    Please note that we are not able to fill out paperwork that might be related to your work, utility company, disability, and/or driving, among others, in between the visits.  Please schedule a dedicated appointment to address your paperwork, so we can do that in a timely manner.  This is not due to lack of concern or interest for your disease-related work/administrative problems, but to make sure that we provide the best possible care and to fill out your paperwork in a correct and timely manner.    Thank you for entrusting your neurological care to [facility] and we look forward to continuing to serve you.

## 2025-05-21 DIAGNOSIS — E78.2 MIXED HYPERLIPIDEMIA: ICD-10-CM

## 2025-05-21 RX ORDER — ATORVASTATIN CALCIUM 20 MG/1
20 TABLET, FILM COATED ORAL
Qty: 100 TABLET | Refills: 1 | Status: SHIPPED | OUTPATIENT
Start: 2025-05-21

## 2025-05-21 NOTE — PROGRESS NOTES
"[facility]    Pt has seen a [facility] provider in past 12 months and in need of prescription refills per protocol.  A 100 day supply is provided whenever possible to improve adherence rates.     No results found for: \"HBA1C\"   No results found for: \"MICROALBCALC\", \"MALBCRT\", \"MALBEXCR\", \"MDYHXV64\", \"MICROALBUR\", \"MICRALB\", \"UMICROALBUM\", \"MICROALBTIM\"   Lab Results   Component Value Date/Time    ALKPHOSPHAT 85 10/12/2024 09:23 PM    ASTSGOT 23 10/12/2024 09:23 PM    ALTSGPT 18 10/12/2024 09:23 PM    TBILIRUBIN 0.7 10/12/2024 09:23 PM      Lab Results   Component Value Date/Time    SODIUM 141 10/12/2024 09:23 PM    POTASSIUM 4.0 10/12/2024 09:23 PM    CHLORIDE 103 10/12/2024 09:23 PM    CO2 21 10/12/2024 09:23 PM    GLUCOSE 123 (H) 10/12/2024 09:23 PM    BUN 13 10/12/2024 09:23 PM    CREATININE 0.61 10/12/2024 09:23 PM        Meds refilled:  Atorvastatin    Enrique Cueto, PharmD           "

## 2025-06-07 DIAGNOSIS — Z86.73 HISTORY OF STROKE: ICD-10-CM

## 2025-06-07 DIAGNOSIS — F33.9 RECURRENT MAJOR DEPRESSIVE DISORDER, REMISSION STATUS UNSPECIFIED (HCC): ICD-10-CM

## 2025-06-10 RX ORDER — BACLOFEN 10 MG/1
TABLET ORAL
Qty: 90 TABLET | Refills: 1 | Status: SHIPPED | OUTPATIENT
Start: 2025-06-10

## 2025-06-10 NOTE — TELEPHONE ENCOUNTER
BACLOFEN 10 MG TABLET     Received request via: Pharmacy    Was the patient seen in the last year in this department? No, last visit 11/13/24    Does the patient have an active prescription (recently filled or refills available) for medication(s) requested? Yes    Pharmacy Name: [facility]     Does the patient have [facility] and need 100-day supply? (This applies to ALL medications) Yes

## 2025-06-11 RX ORDER — SERTRALINE HYDROCHLORIDE 25 MG/1
25 TABLET, FILM COATED ORAL DAILY
Qty: 90 TABLET | Refills: 1 | Status: SHIPPED | OUTPATIENT
Start: 2025-06-11

## 2025-11-10 ENCOUNTER — APPOINTMENT (OUTPATIENT)
Facility: MEDICAL CENTER | Age: 75
End: 2025-11-10
Attending: PSYCHIATRY & NEUROLOGY
Payer: MEDICARE